# Patient Record
Sex: FEMALE | Race: WHITE | NOT HISPANIC OR LATINO | Employment: OTHER
[De-identification: names, ages, dates, MRNs, and addresses within clinical notes are randomized per-mention and may not be internally consistent; named-entity substitution may affect disease eponyms.]

---

## 2018-11-28 ENCOUNTER — PRIOR ORIGINAL RECORDS (OUTPATIENT)
Dept: OTHER | Age: 33
End: 2018-11-28

## 2018-12-13 ENCOUNTER — PRIOR ORIGINAL RECORDS (OUTPATIENT)
Dept: OTHER | Age: 33
End: 2018-12-13

## 2018-12-13 ENCOUNTER — HOSPITAL ENCOUNTER (OUTPATIENT)
Dept: CV DIAGNOSTICS | Facility: HOSPITAL | Age: 33
Discharge: HOME | End: 2018-12-13
Attending: INTERNAL MEDICINE
Payer: COMMERCIAL

## 2018-12-13 DIAGNOSIS — I47.2 VENTRICULAR TACHYARRHYTHMIA (HCC): ICD-10-CM

## 2018-12-13 PROCEDURE — 93226 XTRNL ECG REC<48 HR SCAN A/R: CPT | Performed by: INTERNAL MEDICINE

## 2018-12-13 PROCEDURE — 93225 XTRNL ECG REC<48 HRS REC: CPT | Performed by: INTERNAL MEDICINE

## 2018-12-13 PROCEDURE — 93018 CV STRESS TEST I&R ONLY: CPT | Performed by: INTERNAL MEDICINE

## 2018-12-13 PROCEDURE — 93017 CV STRESS TEST TRACING ONLY: CPT | Performed by: INTERNAL MEDICINE

## 2018-12-13 PROCEDURE — 93227 XTRNL ECG REC<48 HR R&I: CPT | Performed by: INTERNAL MEDICINE

## 2018-12-13 NOTE — PROGRESS NOTES
Pt in dept for treadmill stress test. Pt with multiple multifocal PVCs, v-triplets, v-couplets with exercise. Pt had 23 beats VT (approx 8 seconds) at peak exercise (13:25 minutes). Pt denies cardiac symptoms. States she only felt fatigued at peak.  S/w Moiz Mendoza

## 2018-12-17 ENCOUNTER — PRIOR ORIGINAL RECORDS (OUTPATIENT)
Dept: OTHER | Age: 33
End: 2018-12-17

## 2019-01-03 ENCOUNTER — TELEPHONE (OUTPATIENT)
Dept: OBGYN CLINIC | Facility: CLINIC | Age: 34
End: 2019-01-03

## 2019-01-03 NOTE — TELEPHONE ENCOUNTER
LMP: 18  EDC based on lmp: 19    8 wks on 19        Are cycles regular?: yes    Medical problems: ventricular tachycardia with hx of ablation 2 yrs ago, exercise induced arrhythmia    Past sx hx: heart ablation 2 years ago    Current me

## 2019-01-21 ENCOUNTER — OFFICE VISIT (OUTPATIENT)
Dept: OBGYN CLINIC | Facility: CLINIC | Age: 34
End: 2019-01-21
Payer: COMMERCIAL

## 2019-01-21 ENCOUNTER — NURSE ONLY (OUTPATIENT)
Dept: OBGYN CLINIC | Facility: CLINIC | Age: 34
End: 2019-01-21
Payer: COMMERCIAL

## 2019-01-21 VITALS
HEART RATE: 53 BPM | SYSTOLIC BLOOD PRESSURE: 96 MMHG | DIASTOLIC BLOOD PRESSURE: 62 MMHG | WEIGHT: 135 LBS | HEIGHT: 64.5 IN | BODY MASS INDEX: 22.77 KG/M2

## 2019-01-21 DIAGNOSIS — N91.2 AMENORRHEA: Primary | ICD-10-CM

## 2019-01-21 PROCEDURE — 99203 OFFICE O/P NEW LOW 30 MIN: CPT | Performed by: OBSTETRICS & GYNECOLOGY

## 2019-01-21 PROCEDURE — 76817 TRANSVAGINAL US OBSTETRIC: CPT | Performed by: OBSTETRICS & GYNECOLOGY

## 2019-01-21 NOTE — PROGRESS NOTES
Amenorrhea  Regular menses  LMP 11/29/18   7w 4d      C/w scan   7 w 5d  Will use EDC 9/5/19 by LMP    PMH: Tachycardia (SVT) [facility] cardiology  [facility]: cardiac ablation, T&A, hernia repair (infant)    Prenatal care and course discussed with patient including u

## 2019-01-30 ENCOUNTER — PRIOR ORIGINAL RECORDS (OUTPATIENT)
Dept: OTHER | Age: 34
End: 2019-01-30

## 2019-01-30 ENCOUNTER — MYAURORA ACCOUNT LINK (OUTPATIENT)
Dept: OTHER | Age: 34
End: 2019-01-30

## 2019-02-04 ENCOUNTER — INITIAL PRENATAL (OUTPATIENT)
Dept: OBGYN CLINIC | Facility: CLINIC | Age: 34
End: 2019-02-04
Payer: COMMERCIAL

## 2019-02-04 VITALS
BODY MASS INDEX: 22.94 KG/M2 | WEIGHT: 136 LBS | HEART RATE: 56 BPM | HEIGHT: 64.5 IN | DIASTOLIC BLOOD PRESSURE: 70 MMHG | SYSTOLIC BLOOD PRESSURE: 110 MMHG

## 2019-02-04 DIAGNOSIS — Z36.9 ENCOUNTER FOR ANTENATAL SCREENING OF MOTHER: ICD-10-CM

## 2019-02-04 DIAGNOSIS — Z86.79 H/O SUSTAINED VENTRICULAR TACHYCARDIA: ICD-10-CM

## 2019-02-04 DIAGNOSIS — Z34.01 ENCOUNTER FOR SUPERVISION OF NORMAL FIRST PREGNANCY, FIRST TRIMESTER: Primary | ICD-10-CM

## 2019-02-04 PROCEDURE — 87086 URINE CULTURE/COLONY COUNT: CPT | Performed by: OBSTETRICS & GYNECOLOGY

## 2019-02-06 ENCOUNTER — PRIOR ORIGINAL RECORDS (OUTPATIENT)
Dept: OTHER | Age: 34
End: 2019-02-06

## 2019-02-20 ENCOUNTER — LAB ENCOUNTER (OUTPATIENT)
Dept: LAB | Age: 34
End: 2019-02-20
Attending: OBSTETRICS & GYNECOLOGY
Payer: COMMERCIAL

## 2019-02-20 DIAGNOSIS — Z36.9 ENCOUNTER FOR ANTENATAL SCREENING OF MOTHER: ICD-10-CM

## 2019-02-20 LAB
ANTIBODY SCREEN: NEGATIVE
BASOPHILS # BLD AUTO: 0.03 X10(3) UL (ref 0–0.2)
BASOPHILS NFR BLD AUTO: 0.3 %
DEPRECATED RDW RBC AUTO: 44.9 FL (ref 35.1–46.3)
EOSINOPHIL # BLD AUTO: 0.13 X10(3) UL (ref 0–0.7)
EOSINOPHIL NFR BLD AUTO: 1.3 %
ERYTHROCYTE [DISTWIDTH] IN BLOOD BY AUTOMATED COUNT: 12.9 % (ref 11–15)
HBV SURFACE AG SER-ACNC: <0.1 [IU]/L
HBV SURFACE AG SERPL QL IA: NONREACTIVE
HCT VFR BLD AUTO: 36.2 % (ref 35–48)
HGB BLD-MCNC: 12.2 G/DL (ref 12–16)
IMM GRANULOCYTES # BLD AUTO: 0.03 X10(3) UL (ref 0–1)
IMM GRANULOCYTES NFR BLD: 0.3 %
LYMPHOCYTES # BLD AUTO: 1.8 X10(3) UL (ref 1–4)
LYMPHOCYTES NFR BLD AUTO: 18.1 %
MCH RBC QN AUTO: 32 PG (ref 26–34)
MCHC RBC AUTO-ENTMCNC: 33.7 G/DL (ref 31–37)
MCV RBC AUTO: 95 FL (ref 80–100)
MONOCYTES # BLD AUTO: 0.74 X10(3) UL (ref 0.1–1)
MONOCYTES NFR BLD AUTO: 7.4 %
NEUTROPHILS # BLD AUTO: 7.24 X10 (3) UL (ref 1.5–7.7)
NEUTROPHILS # BLD AUTO: 7.24 X10(3) UL (ref 1.5–7.7)
NEUTROPHILS NFR BLD AUTO: 72.6 %
PLATELET # BLD AUTO: 198 10(3)UL (ref 150–450)
RBC # BLD AUTO: 3.81 X10(6)UL (ref 3.8–5.3)
RH BLOOD TYPE: POSITIVE
RUBV IGG SER QL: POSITIVE
RUBV IGG SER-ACNC: 94.2 IU/ML (ref 10–?)
T PALLIDUM AB SER QL IA: NONREACTIVE
WBC # BLD AUTO: 10 X10(3) UL (ref 4–11)

## 2019-02-20 PROCEDURE — 87389 HIV-1 AG W/HIV-1&-2 AB AG IA: CPT | Performed by: OBSTETRICS & GYNECOLOGY

## 2019-02-20 PROCEDURE — 86780 TREPONEMA PALLIDUM: CPT | Performed by: OBSTETRICS & GYNECOLOGY

## 2019-02-20 PROCEDURE — 36415 COLL VENOUS BLD VENIPUNCTURE: CPT | Performed by: OBSTETRICS & GYNECOLOGY

## 2019-02-20 PROCEDURE — 85025 COMPLETE CBC W/AUTO DIFF WBC: CPT | Performed by: OBSTETRICS & GYNECOLOGY

## 2019-02-20 PROCEDURE — 86900 BLOOD TYPING SEROLOGIC ABO: CPT | Performed by: OBSTETRICS & GYNECOLOGY

## 2019-02-20 PROCEDURE — 86850 RBC ANTIBODY SCREEN: CPT | Performed by: OBSTETRICS & GYNECOLOGY

## 2019-02-20 PROCEDURE — 86762 RUBELLA ANTIBODY: CPT | Performed by: OBSTETRICS & GYNECOLOGY

## 2019-02-20 PROCEDURE — 87340 HEPATITIS B SURFACE AG IA: CPT | Performed by: OBSTETRICS & GYNECOLOGY

## 2019-02-20 PROCEDURE — 86901 BLOOD TYPING SEROLOGIC RH(D): CPT | Performed by: OBSTETRICS & GYNECOLOGY

## 2019-02-24 ENCOUNTER — APPOINTMENT (OUTPATIENT)
Dept: ULTRASOUND IMAGING | Facility: HOSPITAL | Age: 34
DRG: 832 | End: 2019-02-24
Attending: EMERGENCY MEDICINE
Payer: COMMERCIAL

## 2019-02-24 ENCOUNTER — HOSPITAL ENCOUNTER (INPATIENT)
Facility: HOSPITAL | Age: 34
LOS: 1 days | Discharge: HOME | DRG: 832 | End: 2019-02-25
Attending: EMERGENCY MEDICINE | Admitting: INTERNAL MEDICINE
Payer: COMMERCIAL

## 2019-02-24 DIAGNOSIS — I47.2 VENTRICULAR TACHYCARDIA (HCC): Primary | ICD-10-CM

## 2019-02-24 LAB
ALBUMIN SERPL-MCNC: 3.3 G/DL (ref 3.4–5)
ALBUMIN/GLOB SERPL: 0.9 {RATIO} (ref 1–2)
ALP LIVER SERPL-CCNC: 61 U/L (ref 37–98)
ALT SERPL-CCNC: 34 U/L (ref 13–56)
ANION GAP SERPL CALC-SCNC: 9 MMOL/L (ref 0–18)
AST SERPL-CCNC: 30 U/L (ref 15–37)
ATRIAL RATE: 220 BPM
ATRIAL RATE: 72 BPM
BASOPHILS # BLD AUTO: 0.06 X10(3) UL (ref 0–0.2)
BASOPHILS NFR BLD AUTO: 0.6 %
BILIRUB SERPL-MCNC: 0.2 MG/DL (ref 0.1–2)
BUN BLD-MCNC: 14 MG/DL (ref 7–18)
BUN/CREAT SERPL: 17.7 (ref 10–20)
CALCIUM BLD-MCNC: 9 MG/DL (ref 8.5–10.1)
CHLORIDE SERPL-SCNC: 108 MMOL/L (ref 98–107)
CO2 SERPL-SCNC: 23 MMOL/L (ref 21–32)
CREAT BLD-MCNC: 0.79 MG/DL (ref 0.55–1.02)
DEPRECATED RDW RBC AUTO: 42 FL (ref 35.1–46.3)
EOSINOPHIL # BLD AUTO: 0.22 X10(3) UL (ref 0–0.7)
EOSINOPHIL NFR BLD AUTO: 2 %
ERYTHROCYTE [DISTWIDTH] IN BLOOD BY AUTOMATED COUNT: 12.8 % (ref 11–15)
GLOBULIN PLAS-MCNC: 3.6 G/DL (ref 2.8–4.4)
GLUCOSE BLD-MCNC: 98 MG/DL (ref 70–99)
HAV IGM SER QL: 1.9 MG/DL (ref 1.6–2.6)
HCT VFR BLD AUTO: 38.6 % (ref 35–48)
HGB BLD-MCNC: 13.7 G/DL (ref 12–16)
IMM GRANULOCYTES # BLD AUTO: 0.03 X10(3) UL (ref 0–1)
IMM GRANULOCYTES NFR BLD: 0.3 %
LYMPHOCYTES # BLD AUTO: 2.87 X10(3) UL (ref 1–4)
LYMPHOCYTES NFR BLD AUTO: 26.5 %
M PROTEIN MFR SERPL ELPH: 6.9 G/DL (ref 6.4–8.2)
MCH RBC QN AUTO: 32.1 PG (ref 26–34)
MCHC RBC AUTO-ENTMCNC: 35.5 G/DL (ref 31–37)
MCV RBC AUTO: 90.4 FL (ref 80–100)
MONOCYTES # BLD AUTO: 0.86 X10(3) UL (ref 0.1–1)
MONOCYTES NFR BLD AUTO: 7.9 %
NEUTROPHILS # BLD AUTO: 6.78 X10 (3) UL (ref 1.5–7.7)
NEUTROPHILS # BLD AUTO: 6.78 X10(3) UL (ref 1.5–7.7)
NEUTROPHILS NFR BLD AUTO: 62.7 %
OSMOLALITY SERPL CALC.SUM OF ELEC: 290 MOSM/KG (ref 275–295)
P AXIS: 67 DEGREES
P-R INTERVAL: 140 MS
PLATELET # BLD AUTO: 245 10(3)UL (ref 150–450)
POTASSIUM SERPL-SCNC: 3.7 MMOL/L (ref 3.5–5.1)
Q-T INTERVAL: 270 MS
Q-T INTERVAL: 322 MS
QRS DURATION: 168 MS
QRS DURATION: 82 MS
QTC CALCULATION (BEZET): 352 MS
QTC CALCULATION (BEZET): 525 MS
R AXIS: 61 DEGREES
R AXIS: 63 DEGREES
RBC # BLD AUTO: 4.27 X10(6)UL (ref 3.8–5.3)
SODIUM SERPL-SCNC: 140 MMOL/L (ref 136–145)
T AXIS: 248 DEGREES
T AXIS: 75 DEGREES
TROPONIN I SERPL-MCNC: 0.12 NG/ML (ref ?–0.04)
VENTRICULAR RATE: 227 BPM
VENTRICULAR RATE: 72 BPM
WBC # BLD AUTO: 10.8 X10(3) UL (ref 4–11)

## 2019-02-24 PROCEDURE — 5A2204Z RESTORATION OF CARDIAC RHYTHM, SINGLE: ICD-10-PCS | Performed by: EMERGENCY MEDICINE

## 2019-02-24 PROCEDURE — 76801 OB US < 14 WKS SINGLE FETUS: CPT | Performed by: EMERGENCY MEDICINE

## 2019-02-24 PROCEDURE — 99223 1ST HOSP IP/OBS HIGH 75: CPT | Performed by: INTERNAL MEDICINE

## 2019-02-24 RX ORDER — MIDAZOLAM HYDROCHLORIDE 1 MG/ML
INJECTION INTRAMUSCULAR; INTRAVENOUS
Status: DISPENSED
Start: 2019-02-24 | End: 2019-02-25

## 2019-02-24 RX ORDER — ETOMIDATE 2 MG/ML
INJECTION INTRAVENOUS
Status: COMPLETED | OUTPATIENT
Start: 2019-02-24 | End: 2019-02-24

## 2019-02-24 RX ORDER — ENOXAPARIN SODIUM 100 MG/ML
40 INJECTION SUBCUTANEOUS DAILY
Status: DISCONTINUED | OUTPATIENT
Start: 2019-02-24 | End: 2019-02-25

## 2019-02-24 RX ORDER — ADENOSINE 3 MG/ML
6 INJECTION, SOLUTION INTRAVENOUS ONCE
Status: DISCONTINUED | OUTPATIENT
Start: 2019-02-24 | End: 2019-02-25

## 2019-02-24 RX ORDER — ADENOSINE 3 MG/ML
INJECTION, SOLUTION INTRAVENOUS
Status: DISPENSED
Start: 2019-02-24 | End: 2019-02-25

## 2019-02-24 RX ORDER — AMIODARONE HYDROCHLORIDE 50 MG/ML
INJECTION, SOLUTION INTRAVENOUS
Status: COMPLETED
Start: 2019-02-24 | End: 2019-02-24

## 2019-02-24 RX ORDER — ACEBUTOLOL HYDROCHLORIDE 200 MG/1
200 CAPSULE ORAL DAILY PRN
Status: ON HOLD | COMMUNITY
End: 2019-02-25

## 2019-02-24 RX ORDER — SODIUM CHLORIDE 9 MG/ML
INJECTION, SOLUTION INTRAVENOUS CONTINUOUS
Status: DISCONTINUED | OUTPATIENT
Start: 2019-02-24 | End: 2019-02-25

## 2019-02-24 RX ORDER — CHOLECALCIFEROL (VITAMIN D3) 25 MCG
1 TABLET,CHEWABLE ORAL DAILY
Status: DISCONTINUED | OUTPATIENT
Start: 2019-02-25 | End: 2019-02-25

## 2019-02-24 RX ORDER — MIDAZOLAM HYDROCHLORIDE 5 MG/ML
2 INJECTION INTRAMUSCULAR; INTRAVENOUS ONCE
Status: DISCONTINUED | OUTPATIENT
Start: 2019-02-24 | End: 2019-02-24

## 2019-02-24 NOTE — ED PROVIDER NOTES
Patient Seen in: [facility] Emergency Department    History   Patient presents with:  Arrythmia/Palpitations (cardiovascular)    Stated Complaint: palpitations    HPI    Patient is a 39-year-old  pregnant female whose EGA is approximately 15 we distress. Neuro: CN II-XII intact. Grossly normal and symmetric motor strength and sensation proximally and distally throughout 4 extremities. HEENT: No lymphadenopathy.  Mucus membranes moist.   Supple neck without any meningismus or rigidity  Cardiovas noted on EKG Report. Rate: 227  Rhythm: Ventricular tachycardia  Reading: Ventricular tachycardia, with a widened QRS duration 160 ms              On arrival the patient she states that she believes she is in ventricular tachycardia.   She was immediately segment changes. See above. I believe the mildly elevated troponin is likely from her increased ventricular rate and strain. She has been observed in the ED, and has been hemodynamically stable, in normal sinus rhythm.   She will be admitted to the

## 2019-02-24 NOTE — H&P
[facility]  History and Physical     Celi Weber Patient Status:  Emergency    10/14/1985 MRN [id number]   Location [address] [facility] Attending Ewa Tan MD   Hosp Day # 0 PCP Jadyn Velásquez M.D.      Chief Complaint: (OMEGA-3-6-9 OR) Take by mouth. Disp:  Rfl:    Multiple Vitamins-Minerals (TAB-A-ROSENDO MAXIMUM) Oral Tab Take 1 tablet by mouth daily. Disp:  Rfl:    B Complex-C-Folic Acid (EQL B COMPLEX) Oral Tab Take 1 tablet by mouth daily.  Disp:  Rfl:    Cholecalcifero PLAN:     1. Wide complex tachycardia s/p cardioversion   1. Defer management to cardiologist/ EP  2. Tele   3. TSH  4. Replace K, Mg  2. Hypotension   1. Resolved. Due to meds  2. Monitor , gentle hydration  3. Mild elevation of troponin   1.  Due to deman

## 2019-02-24 NOTE — ED INITIAL ASSESSMENT (HPI)
Patient arrives with c/o palpitations that began 20 minutes PTA. Patient was working out when she felt palpitations. Patient is 12 weeks pregnant.

## 2019-02-24 NOTE — CONSULTS
[facility]  Report of Consultation    Susan Mercedes Patient Status:  Emergency    10/14/1985 MRN [id number]   Location [address] Attending Janet Tan MD   Hosp Day # 0 PCP Geo Pastor M.D.     [facility] for [facility] described above in HPI. Physical Exam:  Blood pressure 104/62, pulse 62, resp. rate 18, last menstrual period 11/29/2018, SpO2 100 %. No data recorded.     Wt Readings from Last 3 Encounters:  02/04/19 : 136 lb (61.7 kg)  01/21/19 : 135 lb (61.2 kg)  07

## 2019-02-25 VITALS
RESPIRATION RATE: 16 BRPM | HEART RATE: 50 BPM | BODY MASS INDEX: 23.27 KG/M2 | WEIGHT: 138 LBS | TEMPERATURE: 98 F | OXYGEN SATURATION: 95 % | DIASTOLIC BLOOD PRESSURE: 50 MMHG | SYSTOLIC BLOOD PRESSURE: 93 MMHG | HEIGHT: 64.5 IN

## 2019-02-25 LAB
ANION GAP SERPL CALC-SCNC: 4 MMOL/L (ref 0–18)
BUN BLD-MCNC: 6 MG/DL (ref 7–18)
BUN/CREAT SERPL: 9.8 (ref 10–20)
CALCIUM BLD-MCNC: 8.2 MG/DL (ref 8.5–10.1)
CHLORIDE SERPL-SCNC: 111 MMOL/L (ref 98–107)
CO2 SERPL-SCNC: 25 MMOL/L (ref 21–32)
CREAT BLD-MCNC: 0.61 MG/DL (ref 0.55–1.02)
GLUCOSE BLD-MCNC: 85 MG/DL (ref 70–99)
HAV IGM SER QL: 1.6 MG/DL (ref 1.6–2.6)
OSMOLALITY SERPL CALC.SUM OF ELEC: 287 MOSM/KG (ref 275–295)
POTASSIUM SERPL-SCNC: 3.8 MMOL/L (ref 3.5–5.1)
SODIUM SERPL-SCNC: 140 MMOL/L (ref 136–145)
TSI SER-ACNC: 0.98 MIU/ML (ref 0.36–3.74)

## 2019-02-25 PROCEDURE — 99232 SBSQ HOSP IP/OBS MODERATE 35: CPT | Performed by: HOSPITALIST

## 2019-02-25 RX ORDER — DOXEPIN HYDROCHLORIDE 50 MG/1
3 CAPSULE ORAL EVERY MORNING
Status: DISCONTINUED | OUTPATIENT
Start: 2019-02-25 | End: 2019-02-25 | Stop reason: SDUPTHER

## 2019-02-25 RX ORDER — ACETAMINOPHEN 325 MG/1
TABLET ORAL
Status: DISCONTINUED
Start: 2019-02-25 | End: 2019-02-25

## 2019-02-25 RX ORDER — POTASSIUM CHLORIDE 20 MEQ/1
40 TABLET, EXTENDED RELEASE ORAL ONCE
Status: COMPLETED | OUTPATIENT
Start: 2019-02-25 | End: 2019-02-25

## 2019-02-25 RX ORDER — ACETAMINOPHEN 325 MG/1
650 TABLET ORAL EVERY 6 HOURS PRN
Status: DISCONTINUED | OUTPATIENT
Start: 2019-02-25 | End: 2019-02-25

## 2019-02-25 RX ORDER — DOXEPIN HYDROCHLORIDE 50 MG/1
3 CAPSULE ORAL EVERY MORNING
Status: DISCONTINUED | OUTPATIENT
Start: 2019-02-25 | End: 2019-02-25

## 2019-02-25 RX ORDER — MAGNESIUM OXIDE 400 MG (241.3 MG MAGNESIUM) TABLET
400 TABLET ONCE
Status: COMPLETED | OUTPATIENT
Start: 2019-02-25 | End: 2019-02-25

## 2019-02-25 RX ORDER — ACEBUTOLOL HYDROCHLORIDE 200 MG/1
200 CAPSULE ORAL DAILY
Status: DISCONTINUED | OUTPATIENT
Start: 2019-02-25 | End: 2019-02-25

## 2019-02-25 RX ORDER — ACEBUTOLOL HYDROCHLORIDE 200 MG/1
200 CAPSULE ORAL DAILY
Refills: 0 | Status: SHIPPED | COMMUNITY
Start: 2019-02-25 | End: 2019-08-13

## 2019-02-25 NOTE — PLAN OF CARE
CARDIOVASCULAR - ADULT    • Maintains optimal cardiac output and hemodynamic stability Progressing    • Absence of cardiac arrhythmias or at baseline Progressing        Tele monitoring. I/o. IVF.  at bedside.  Possible discharge in am.

## 2019-02-25 NOTE — PLAN OF CARE
CARDIOVASCULAR - ADULT    • Maintains optimal cardiac output and hemodynamic stability Adequate for Discharge    • Absence of cardiac arrhythmias or at baseline Adequate for Discharge        Iv and tele dc;d. Patient tolerated well. IV catheter tip intact.

## 2019-02-25 NOTE — PROGRESS NOTES
[facility]  Progress Note    Rohini Fajardo Patient Status:  Inpatient    10/14/1985 MRN [id number]   [facility] 8NE-A Attending Monty Ritter MD   Hosp Day # 1 PCP Luz Marina Back M.D. Assessment:  1. VT- RVOT.  Had a previous abla 245.0 02/24/2019     No results found for: PT, INR  Lab Results   Component Value Date     02/25/2019    K 3.8 02/25/2019     02/25/2019    CO2 25.0 02/25/2019    BUN 6 02/25/2019    CREATSERUM 0.61 02/25/2019    GLU 85 02/25/2019    MG 1.6 02/

## 2019-02-25 NOTE — PROGRESS NOTES
[facility]  Progress note     Shani Lora Patient Status:  Emergency    10/14/1985 MRN [id number]   Location [address] [facility] Attending Will, Lenoard Leyden, MD   Hosp Day # 1 PCP Randolph Concepcion M.D.      Chief Complaint: palpit 1. Resolved. Due to meds  2. Monitor , gentle hydration  3. Mild elevation of troponin   1. Due to demand ischemia, per #1, monitor  4. Pregnancy first trimester- OB follow up as usual.     Await EP recs.   Possibly home later today if no further workup p

## 2019-02-25 NOTE — PLAN OF CARE
CARDIOVASCULAR - ADULT    • Maintains optimal cardiac output and hemodynamic stability Progressing    • Absence of cardiac arrhythmias or at baseline Progressing          CARDIOVASCULAR - ADULT    • Maintains optimal cardiac output and hemodynamic stabilit

## 2019-02-26 NOTE — DISCHARGE SUMMARY
[facility]  DISCHARGE SUMMARY     Helen Delong Patient Status:  Inpatient    10/14/1985 MRN [id number]   [facility] 8NE-A Attending No att. providers found   Hosp Day # 1 PCP Lorri Najjar, M.D.      Date of Admission: 2019  Da Caps  Commonly known as:  SECTRAL  What changed:    · when to take this  · reasons to take this      Take 1 capsule (200 mg total) by mouth daily.    Refills:  0        CONTINUE taking these medications      Instructions Prescription details   EQL B COMPLEX

## 2019-02-27 ENCOUNTER — TELEPHONE (OUTPATIENT)
Dept: OBGYN CLINIC | Facility: CLINIC | Age: 34
End: 2019-02-27

## 2019-02-27 NOTE — TELEPHONE ENCOUNTER
Received call from ED physician who treated Jatin Ocampo for V. Tachycardia. She was given a single dose of Amioderone, 150 mg. Apparent risk for fetal hypothyroidism, cases of cretinism reported. Spoke with cardiology and Dr. Elsi Julian.   Probable low risk, pharma

## 2019-02-28 VITALS
WEIGHT: 135 LBS | SYSTOLIC BLOOD PRESSURE: 90 MMHG | HEART RATE: 60 BPM | BODY MASS INDEX: 22.49 KG/M2 | DIASTOLIC BLOOD PRESSURE: 60 MMHG | HEIGHT: 65 IN

## 2019-02-28 VITALS
DIASTOLIC BLOOD PRESSURE: 64 MMHG | WEIGHT: 133 LBS | SYSTOLIC BLOOD PRESSURE: 92 MMHG | HEIGHT: 65 IN | BODY MASS INDEX: 22.16 KG/M2 | HEART RATE: 60 BPM

## 2019-02-28 NOTE — PAYOR COMM NOTE
--------------  ADMISSION REVIEW     Payor: MORIS GROVER  Subscriber #:  [id number]  Authorization Number: [id number]    Admit date: 2/24/19  Admit time: 7844       Patient Seen in: [facility] Emergency Department    History   Patient presents with:  Ar bilaterally. Lungs: Speaking full sentences without any distress or retractions. Clear to auscultation bilaterally no wheezes or rales or rhonchi. Abdomen: Normoactive bowel sounds. Soft, nondistended. No HSM or masses.  Nontender throughout abdomen to ruiz ER. She is no in NSR and stable.      Past Medical History:  Past Medical History:   Diagnosis Date   • Arrhythmia    • Human papilloma virus infection         Past Surgical History:   Past Surgical History:   Procedure Laterality Date   • ABLATION     • AD affect.       Diagnostic Data:      Labs:  Recent Labs   Lab  02/20/19   1529  02/24/19   1526   WBC  10.0  10.8   HGB  12.2  13.7   MCV  95.0  90.4   PLT  198.0  245.0       Recent Labs   Lab  02/24/19   1526   GLU  98   BUN  14   CREATSERUM  0.79   GFRAA

## 2019-03-04 ENCOUNTER — ROUTINE PRENATAL (OUTPATIENT)
Dept: OBGYN CLINIC | Facility: CLINIC | Age: 34
End: 2019-03-04
Payer: COMMERCIAL

## 2019-03-04 ENCOUNTER — ULTRASOUND ENCOUNTER (OUTPATIENT)
Dept: OBGYN CLINIC | Facility: CLINIC | Age: 34
End: 2019-03-04
Payer: COMMERCIAL

## 2019-03-04 VITALS
BODY MASS INDEX: 23.27 KG/M2 | HEIGHT: 64.5 IN | DIASTOLIC BLOOD PRESSURE: 58 MMHG | SYSTOLIC BLOOD PRESSURE: 96 MMHG | WEIGHT: 138 LBS

## 2019-03-04 DIAGNOSIS — O41.8X10 SUBCHORIONIC HEMATOMA IN FIRST TRIMESTER, SINGLE OR UNSPECIFIED FETUS: Primary | ICD-10-CM

## 2019-03-04 DIAGNOSIS — O46.8X1 SUBCHORIONIC HEMATOMA IN FIRST TRIMESTER, SINGLE OR UNSPECIFIED FETUS: Primary | ICD-10-CM

## 2019-03-04 PROCEDURE — 99213 OFFICE O/P EST LOW 20 MIN: CPT | Performed by: OBSTETRICS & GYNECOLOGY

## 2019-03-04 PROCEDURE — 76801 OB US < 14 WKS SINGLE FETUS: CPT | Performed by: OBSTETRICS & GYNECOLOGY

## 2019-03-04 NOTE — PATIENT INSTRUCTIONS
Medications Safe in Pregnancy  The following over-the-counter medications may be taken safely after 12 weeks gestation by any patient who is pregnant. Please follow the instructions on the package for adult dosage.   If you experience any symptoms danae

## 2019-03-04 NOTE — PROGRESS NOTES
POB  Reports vaginal bleeding that began as spotting and now more persistent   Denies abdominal or pelvic pain   Rh positive   OB US noted for large subchorionic hematoma, c/w with OB US on 02/24/19  D/w patient and her mother subchorionic hematoma includi

## 2019-03-09 ENCOUNTER — ROUTINE PRENATAL (OUTPATIENT)
Dept: OBGYN CLINIC | Facility: CLINIC | Age: 34
End: 2019-03-09
Payer: COMMERCIAL

## 2019-03-09 ENCOUNTER — TELEPHONE (OUTPATIENT)
Dept: OBGYN CLINIC | Facility: CLINIC | Age: 34
End: 2019-03-09

## 2019-03-09 VITALS — WEIGHT: 138.38 LBS | SYSTOLIC BLOOD PRESSURE: 104 MMHG | DIASTOLIC BLOOD PRESSURE: 58 MMHG | BODY MASS INDEX: 23 KG/M2

## 2019-03-09 DIAGNOSIS — O41.8X10 SUBCHORIONIC HEMATOMA IN FIRST TRIMESTER, SINGLE OR UNSPECIFIED FETUS: ICD-10-CM

## 2019-03-09 DIAGNOSIS — Z34.01 ENCOUNTER FOR SUPERVISION OF NORMAL FIRST PREGNANCY, FIRST TRIMESTER: Primary | ICD-10-CM

## 2019-03-09 DIAGNOSIS — O46.8X1 SUBCHORIONIC HEMATOMA IN FIRST TRIMESTER, SINGLE OR UNSPECIFIED FETUS: ICD-10-CM

## 2019-03-09 NOTE — PROGRESS NOTES
Some light bleed intermittent, old from subchorionic bleed  Discussed exercise  Hemorrhoid discussed, metamucil  Amioderone discussed  MFM scan level II  4 weeks

## 2019-03-09 NOTE — TELEPHONE ENCOUNTER
Patient brought in her FMLA forms in today to be completed. Patient also paid for the forms to be done. Patient would like to  the forms at her next appt, April 6, 2019. Forms are in the [facility] office put in the nurse bin.

## 2019-03-16 ENCOUNTER — TELEPHONE (OUTPATIENT)
Dept: OBGYN CLINIC | Facility: CLINIC | Age: 34
End: 2019-03-16

## 2019-03-16 ENCOUNTER — HOSPITAL ENCOUNTER (OUTPATIENT)
Dept: CV DIAGNOSTICS | Facility: HOSPITAL | Age: 34
Discharge: HOME | End: 2019-03-16
Attending: INTERNAL MEDICINE
Payer: COMMERCIAL

## 2019-03-16 DIAGNOSIS — I47.2 VENTRICULAR TACHYCARDIA (HCC): ICD-10-CM

## 2019-03-16 NOTE — PROGRESS NOTES
Pt here for regular stress test. Pt concerned about exercise due to being 15 weeks pregnant. Pt reports she is on pelvic rest and advised against any strenuous exercise.  Dr Oneida Bronson consulted and does not want her heart rate above 140 bpm. 90% APMHR for stre

## 2019-03-16 NOTE — TELEPHONE ENCOUNTER
Pt sent for stress test  Ob says no elevated BP  Cardiologist wants BP elevated  They need OB clearance

## 2019-03-21 ENCOUNTER — MED REC SCAN ONLY (OUTPATIENT)
Dept: OBGYN CLINIC | Facility: CLINIC | Age: 34
End: 2019-03-21

## 2019-03-21 NOTE — TELEPHONE ENCOUNTER
Paperwork completed and signed. Copy to scanning and FMLA folder. Original at  in [place] for patient  at next appt.

## 2019-03-26 ENCOUNTER — TELEPHONE (OUTPATIENT)
Dept: CARDIOLOGY | Age: 34
End: 2019-03-26

## 2019-03-26 DIAGNOSIS — I47.20 VENTRICULAR TACHYCARDIA (CMD): Primary | ICD-10-CM

## 2019-03-29 ENCOUNTER — HOSPITAL ENCOUNTER (OUTPATIENT)
Dept: CV DIAGNOSTICS | Facility: HOSPITAL | Age: 34
Discharge: HOME | End: 2019-03-29
Attending: INTERNAL MEDICINE
Payer: COMMERCIAL

## 2019-03-29 DIAGNOSIS — I47.2 VENTRICULAR TACHYARRHYTHMIA (HCC): ICD-10-CM

## 2019-03-29 PROCEDURE — 93018 CV STRESS TEST I&R ONLY: CPT | Performed by: INTERNAL MEDICINE

## 2019-03-29 PROCEDURE — 93017 CV STRESS TEST TRACING ONLY: CPT | Performed by: INTERNAL MEDICINE

## 2019-03-29 RX ORDER — DIPHENOXYLATE HYDROCHLORIDE AND ATROPINE SULFATE 2.5; .025 MG/1; MG/1
TABLET ORAL
COMMUNITY

## 2019-04-04 PROBLEM — Z34.92 ENCOUNTER FOR SUPERVISION OF NORMAL PREGNANCY IN SECOND TRIMESTER: Status: ACTIVE | Noted: 2019-04-04

## 2019-04-04 NOTE — PROGRESS NOTES
MARISOL.   Doing well. No complaints. Denies abdominal/pelvic pain or vaginal bleeding. Rh positive   Genetic screening, pt request cffDNA and order placed.  Advised to check with insurance prior to collection, pt verbalized understanding and request order

## 2019-04-06 ENCOUNTER — ROUTINE PRENATAL (OUTPATIENT)
Dept: OBGYN CLINIC | Facility: CLINIC | Age: 34
End: 2019-04-06
Payer: COMMERCIAL

## 2019-04-06 VITALS — BODY MASS INDEX: 24 KG/M2 | DIASTOLIC BLOOD PRESSURE: 70 MMHG | SYSTOLIC BLOOD PRESSURE: 106 MMHG | WEIGHT: 142 LBS

## 2019-04-06 DIAGNOSIS — Z34.92 ENCOUNTER FOR SUPERVISION OF NORMAL PREGNANCY IN SECOND TRIMESTER, UNSPECIFIED GRAVIDITY: Primary | ICD-10-CM

## 2019-04-06 DIAGNOSIS — Z13.79 ENCOUNTER FOR GENETIC SCREENING: ICD-10-CM

## 2019-04-06 DIAGNOSIS — I47.2 VENTRICULAR TACHYCARDIA (HCC): ICD-10-CM

## 2019-04-06 DIAGNOSIS — O46.8X1 SUBCHORIONIC HEMATOMA IN FIRST TRIMESTER, SINGLE OR UNSPECIFIED FETUS: ICD-10-CM

## 2019-04-06 DIAGNOSIS — O41.8X10 SUBCHORIONIC HEMATOMA IN FIRST TRIMESTER, SINGLE OR UNSPECIFIED FETUS: ICD-10-CM

## 2019-04-12 ENCOUNTER — TELEPHONE (OUTPATIENT)
Dept: CARDIOLOGY | Age: 34
End: 2019-04-12

## 2019-04-16 ENCOUNTER — MED REC SCAN ONLY (OUTPATIENT)
Dept: SURGERY | Facility: CLINIC | Age: 34
End: 2019-04-16

## 2019-04-23 ENCOUNTER — TELEPHONE (OUTPATIENT)
Dept: OBGYN CLINIC | Facility: CLINIC | Age: 34
End: 2019-04-23

## 2019-04-23 ENCOUNTER — LAB ENCOUNTER (OUTPATIENT)
Dept: LAB | Age: 34
End: 2019-04-23
Attending: OBSTETRICS & GYNECOLOGY
Payer: COMMERCIAL

## 2019-04-23 ENCOUNTER — OFFICE VISIT (OUTPATIENT)
Dept: PERINATAL CARE | Facility: HOSPITAL | Age: 34
End: 2019-04-23
Attending: OBSTETRICS & GYNECOLOGY
Payer: COMMERCIAL

## 2019-04-23 VITALS
WEIGHT: 142 LBS | HEIGHT: 65 IN | DIASTOLIC BLOOD PRESSURE: 59 MMHG | HEART RATE: 48 BPM | SYSTOLIC BLOOD PRESSURE: 95 MMHG | BODY MASS INDEX: 23.66 KG/M2

## 2019-04-23 DIAGNOSIS — Z34.01 ENCOUNTER FOR SUPERVISION OF NORMAL FIRST PREGNANCY IN FIRST TRIMESTER: ICD-10-CM

## 2019-04-23 DIAGNOSIS — Z34.01 ENCOUNTER FOR SUPERVISION OF NORMAL FIRST PREGNANCY IN FIRST TRIMESTER: Primary | ICD-10-CM

## 2019-04-23 DIAGNOSIS — O46.8X1 SUBCHORIONIC HEMATOMA IN FIRST TRIMESTER, SINGLE OR UNSPECIFIED FETUS: ICD-10-CM

## 2019-04-23 DIAGNOSIS — Z13.89 MATERNAL POSTNATAL SCREENING FOR CHROMOSOMAL ANOMALIES: Primary | ICD-10-CM

## 2019-04-23 DIAGNOSIS — I47.2 VENTRICULAR TACHYCARDIA (HCC): ICD-10-CM

## 2019-04-23 DIAGNOSIS — O41.8X10 SUBCHORIONIC HEMATOMA IN FIRST TRIMESTER, SINGLE OR UNSPECIFIED FETUS: ICD-10-CM

## 2019-04-23 PROCEDURE — 99243 OFF/OP CNSLTJ NEW/EST LOW 30: CPT | Performed by: OBSTETRICS & GYNECOLOGY

## 2019-04-23 PROCEDURE — 76811 OB US DETAILED SNGL FETUS: CPT | Performed by: OBSTETRICS & GYNECOLOGY

## 2019-04-23 PROCEDURE — 82105 ALPHA-FETOPROTEIN SERUM: CPT | Performed by: OBSTETRICS & GYNECOLOGY

## 2019-04-23 PROCEDURE — 36415 COLL VENOUS BLD VENIPUNCTURE: CPT | Performed by: OBSTETRICS & GYNECOLOGY

## 2019-04-23 NOTE — PROGRESS NOTES
Pt here for level 2 ultrasound accompanied by her   States no fetal movement felt yet   No complaints

## 2019-04-23 NOTE — TELEPHONE ENCOUNTER
Patient and  presented in office. They were advised by Dr. Jasson Palm to have [address] testing and MSAFP, if covered by insurance. Per , they are both covered and they desire both. Pt already had requisition form and order for Mater[employer] [employer].

## 2019-04-23 NOTE — PROGRESS NOTES
Indication: subchorionic hematoma in 1st tri.   ____________________________________________________________________________  History: Age: 28 years.   ____________________________________________________________________________  Dating:  LMP: 11/29/2018 Structures:  Cervical length 37.4 mm.  ____________________________________________________________________________  Comments:    Dear Dr. Lou Albarado,       Thank you for requesting  an ultrasound and consultation for Anish Frederick regarding AMA.  Her prenatal or known adverse effects of antiarrhythmic drugs on the fetus, antiarrhythmic drugs are often reserved for the treatment of arrhythmias associated with clinically significant symptoms or hemodynamic compromise.  Treatment recommendations are hampered by the

## 2019-04-24 NOTE — PROGRESS NOTES
IMPRESSION:    1.  IUP at   20 5/7 wks    2. Scan consistent with dates     3.   No ultrasound evidence of structural abnormalities are seen   4.  cardiac arrhythmia     Recommendations:    1.  growth U/S  at 30 & 36wks

## 2019-04-29 ENCOUNTER — TELEPHONE (OUTPATIENT)
Dept: OBGYN CLINIC | Facility: CLINIC | Age: 34
End: 2019-04-29

## 2019-04-29 NOTE — TELEPHONE ENCOUNTER
[id number] results received via fax in put on [facility] desk in [place] for review     Thank you

## 2019-04-30 NOTE — TELEPHONE ENCOUNTER
Patient informed of normal results. Verbalized understanding. Already knows sex. No further questions or concerns.

## 2019-05-04 ENCOUNTER — ROUTINE PRENATAL (OUTPATIENT)
Dept: OBGYN CLINIC | Facility: CLINIC | Age: 34
End: 2019-05-04
Payer: COMMERCIAL

## 2019-05-04 VITALS — DIASTOLIC BLOOD PRESSURE: 68 MMHG | BODY MASS INDEX: 25 KG/M2 | SYSTOLIC BLOOD PRESSURE: 100 MMHG | WEIGHT: 148 LBS

## 2019-05-04 DIAGNOSIS — Z34.92 ENCOUNTER FOR SUPERVISION OF NORMAL PREGNANCY IN SECOND TRIMESTER, UNSPECIFIED GRAVIDITY: Primary | ICD-10-CM

## 2019-05-04 NOTE — PROGRESS NOTES
MARISOL  Doing well. Denies LOF/VB/uctx. +FM. RH +  S/P Anatomy scan - nl level II US.  MFM recs: Growth Us at 30 wks and 36 wks  1 hr glucose and CBC ordered  RTC  In 4 weeks

## 2019-05-29 ENCOUNTER — OFFICE VISIT (OUTPATIENT)
Dept: CARDIOLOGY | Age: 34
End: 2019-05-29

## 2019-05-29 VITALS
SYSTOLIC BLOOD PRESSURE: 78 MMHG | HEIGHT: 65 IN | WEIGHT: 147 LBS | BODY MASS INDEX: 24.49 KG/M2 | DIASTOLIC BLOOD PRESSURE: 50 MMHG | HEART RATE: 54 BPM

## 2019-05-29 DIAGNOSIS — I49.3 PVC'S (PREMATURE VENTRICULAR CONTRACTIONS): Primary | ICD-10-CM

## 2019-05-29 PROBLEM — I47.20 VENTRICULAR TACHYCARDIA (CMD): Status: ACTIVE | Noted: 2019-05-29

## 2019-05-29 PROCEDURE — 93000 ELECTROCARDIOGRAM COMPLETE: CPT | Performed by: INTERNAL MEDICINE

## 2019-05-29 PROCEDURE — 99214 OFFICE O/P EST MOD 30 MIN: CPT | Performed by: INTERNAL MEDICINE

## 2019-05-29 RX ORDER — ACEBUTOLOL HYDROCHLORIDE 200 MG/1
200 CAPSULE ORAL DAILY
COMMUNITY
End: 2019-05-29 | Stop reason: SDUPTHER

## 2019-05-29 RX ORDER — ACEBUTOLOL HYDROCHLORIDE 200 MG/1
200 CAPSULE ORAL DAILY
Qty: 90 CAPSULE | Refills: 1 | Status: SHIPPED | OUTPATIENT
Start: 2019-05-29 | End: 2019-11-11 | Stop reason: ALTCHOICE

## 2019-05-29 SDOH — HEALTH STABILITY: PHYSICAL HEALTH: ON AVERAGE, HOW MANY MINUTES DO YOU ENGAGE IN EXERCISE AT THIS LEVEL?: 60 MIN

## 2019-05-29 SDOH — HEALTH STABILITY: PHYSICAL HEALTH: ON AVERAGE, HOW MANY DAYS PER WEEK DO YOU ENGAGE IN MODERATE TO STRENUOUS EXERCISE (LIKE A BRISK WALK)?: 4 DAYS

## 2019-06-01 ENCOUNTER — ROUTINE PRENATAL (OUTPATIENT)
Dept: OBGYN CLINIC | Facility: CLINIC | Age: 34
End: 2019-06-01
Payer: COMMERCIAL

## 2019-06-01 VITALS — SYSTOLIC BLOOD PRESSURE: 106 MMHG | WEIGHT: 152 LBS | DIASTOLIC BLOOD PRESSURE: 62 MMHG | BODY MASS INDEX: 25 KG/M2

## 2019-06-01 DIAGNOSIS — Z34.02 ENCOUNTER FOR SUPERVISION OF NORMAL FIRST PREGNANCY IN SECOND TRIMESTER: Primary | ICD-10-CM

## 2019-06-01 NOTE — PROGRESS NOTES
MARISOL  Doing well  FM daily  RH positive  Encouraged 1 hour GTT/ CBC  TDAP recommended during pregnancy and instructions given  RTC 2 wks

## 2019-06-17 ENCOUNTER — LAB ENCOUNTER (OUTPATIENT)
Dept: LAB | Age: 34
End: 2019-06-17
Attending: OBSTETRICS & GYNECOLOGY
Payer: COMMERCIAL

## 2019-06-17 ENCOUNTER — ROUTINE PRENATAL (OUTPATIENT)
Dept: OBGYN CLINIC | Facility: CLINIC | Age: 34
End: 2019-06-17
Payer: COMMERCIAL

## 2019-06-17 VITALS — DIASTOLIC BLOOD PRESSURE: 58 MMHG | SYSTOLIC BLOOD PRESSURE: 95 MMHG | WEIGHT: 156 LBS | BODY MASS INDEX: 26 KG/M2

## 2019-06-17 DIAGNOSIS — M54.9 BACK PAIN IN PREGNANCY: Primary | ICD-10-CM

## 2019-06-17 DIAGNOSIS — Z3A.28 28 WEEKS GESTATION OF PREGNANCY: Primary | ICD-10-CM

## 2019-06-17 DIAGNOSIS — Z3A.28 28 WEEKS GESTATION OF PREGNANCY: ICD-10-CM

## 2019-06-17 DIAGNOSIS — O99.891 BACK PAIN IN PREGNANCY: Primary | ICD-10-CM

## 2019-06-17 DIAGNOSIS — Z34.03 ENCOUNTER FOR SUPERVISION OF NORMAL FIRST PREGNANCY IN THIRD TRIMESTER: ICD-10-CM

## 2019-06-17 DIAGNOSIS — Z86.79 H/O SUSTAINED VENTRICULAR TACHYCARDIA: ICD-10-CM

## 2019-06-17 DIAGNOSIS — M54.9 BACK PAIN IN PREGNANCY: ICD-10-CM

## 2019-06-17 DIAGNOSIS — Z34.92 ENCOUNTER FOR SUPERVISION OF NORMAL PREGNANCY IN SECOND TRIMESTER, UNSPECIFIED GRAVIDITY: ICD-10-CM

## 2019-06-17 DIAGNOSIS — O99.891 BACK PAIN IN PREGNANCY: ICD-10-CM

## 2019-06-17 PROCEDURE — 36415 COLL VENOUS BLD VENIPUNCTURE: CPT | Performed by: OBSTETRICS & GYNECOLOGY

## 2019-06-17 PROCEDURE — 87389 HIV-1 AG W/HIV-1&-2 AB AG IA: CPT | Performed by: OBSTETRICS & GYNECOLOGY

## 2019-06-17 PROCEDURE — 85025 COMPLETE CBC W/AUTO DIFF WBC: CPT | Performed by: OBSTETRICS & GYNECOLOGY

## 2019-06-17 PROCEDURE — 82950 GLUCOSE TEST: CPT | Performed by: OBSTETRICS & GYNECOLOGY

## 2019-06-17 NOTE — PROGRESS NOTES
MARISOL  Doing well  RH +  S/P Anatomy scan normal  1 hr glucose (24-28wks)today with cbc, hiv  TDAP recommended during pregnancy and instructions given  Very concerned re: plan with her arrhythmia diagnosis, wants a plan in place with cardiology.  Message sent

## 2019-06-18 ENCOUNTER — TELEPHONE (OUTPATIENT)
Dept: OBGYN CLINIC | Facility: CLINIC | Age: 34
End: 2019-06-18

## 2019-06-18 DIAGNOSIS — M54.9 BACK PAIN IN PREGNANCY: Primary | ICD-10-CM

## 2019-06-18 DIAGNOSIS — O99.891 BACK PAIN IN PREGNANCY: Primary | ICD-10-CM

## 2019-06-18 NOTE — TELEPHONE ENCOUNTER
Contacted patient. Let her know referral was placed last night to Physical therapy at [facility].  Patient states that she desires to go to [facility] in [place] on [address]. Brandi Wang order placed and faxed to [facility] at [phone]    Copy to file in [facility]

## 2019-06-26 ENCOUNTER — TELEPHONE (OUTPATIENT)
Dept: OBGYN CLINIC | Facility: CLINIC | Age: 34
End: 2019-06-26

## 2019-07-03 ENCOUNTER — ROUTINE PRENATAL (OUTPATIENT)
Dept: OBGYN CLINIC | Facility: CLINIC | Age: 34
End: 2019-07-03
Payer: COMMERCIAL

## 2019-07-03 ENCOUNTER — ULTRASOUND ENCOUNTER (OUTPATIENT)
Dept: OBGYN CLINIC | Facility: CLINIC | Age: 34
End: 2019-07-03
Payer: COMMERCIAL

## 2019-07-03 VITALS — WEIGHT: 159 LBS | SYSTOLIC BLOOD PRESSURE: 100 MMHG | DIASTOLIC BLOOD PRESSURE: 58 MMHG | BODY MASS INDEX: 26 KG/M2

## 2019-07-03 DIAGNOSIS — I47.2 VENTRICULAR TACHYCARDIA (HCC): ICD-10-CM

## 2019-07-03 DIAGNOSIS — Z34.03 ENCOUNTER FOR SUPERVISION OF NORMAL FIRST PREGNANCY IN THIRD TRIMESTER: Primary | ICD-10-CM

## 2019-07-03 PROBLEM — Z34.93 ENCOUNTER FOR SUPERVISION OF NORMAL PREGNANCY IN THIRD TRIMESTER: Status: ACTIVE | Noted: 2019-04-04

## 2019-07-03 PROCEDURE — 76816 OB US FOLLOW-UP PER FETUS: CPT | Performed by: OBSTETRICS & GYNECOLOGY

## 2019-07-03 NOTE — PROGRESS NOTES
MARISOL  Doing well, +FM  Denies LOF/VB/uctx  Growth Us 33.1% - small HC and BPD. No Zika exposure or recent travel. Maybe constitutional. Will have her do growth Us with MFM. Pt expressed understanding. Rh + , TDAP received.   Patient had long list of Jorge Lainez

## 2019-07-11 NOTE — TELEPHONE ENCOUNTER
Received Progress Notes from [employer] that requires signature.      Placed in Dr Ric tolentino at [facility]

## 2019-07-19 ENCOUNTER — ROUTINE PRENATAL (OUTPATIENT)
Dept: OBGYN CLINIC | Facility: CLINIC | Age: 34
End: 2019-07-19
Payer: COMMERCIAL

## 2019-07-19 VITALS — SYSTOLIC BLOOD PRESSURE: 110 MMHG | WEIGHT: 161 LBS | DIASTOLIC BLOOD PRESSURE: 70 MMHG | BODY MASS INDEX: 27 KG/M2

## 2019-07-19 DIAGNOSIS — O35.0XX0 MICROCEPHALY OF FETUS, SINGLE OR UNSPECIFIED FETUS: ICD-10-CM

## 2019-07-19 DIAGNOSIS — Z23 NEED FOR VACCINATION: ICD-10-CM

## 2019-07-19 DIAGNOSIS — Z34.03 ENCOUNTER FOR SUPERVISION OF NORMAL FIRST PREGNANCY IN THIRD TRIMESTER: Primary | ICD-10-CM

## 2019-07-19 DIAGNOSIS — O26.843 UTERINE SIZE-DATE DISCREPANCY IN THIRD TRIMESTER: ICD-10-CM

## 2019-07-19 PROCEDURE — 90471 IMMUNIZATION ADMIN: CPT | Performed by: OBSTETRICS & GYNECOLOGY

## 2019-07-19 PROCEDURE — 90715 TDAP VACCINE 7 YRS/> IM: CPT | Performed by: OBSTETRICS & GYNECOLOGY

## 2019-07-19 NOTE — PROGRESS NOTES
MARISOL  Doing well, +FM  Denies LOF/VB/uctx  Worried about head measurements today. Changed her mind and wants to see MFM. Referral placed. Rh +, TDAP received.     RTC in 2 wks    PTL and Fetal movement instructions given

## 2019-07-19 NOTE — PATIENT INSTRUCTIONS
FETAL MOVEMENT CHART    Begin counting fetal movements at 32 weeks of pregnancy. You may find that your baby is more active after eating or drinking. We want you to time how long it takes to feel 10 movements (kicks, flutters, swishes or rolls).   [place] especially if you have had one or more children. Each labor can begin in a different way even if you have had four or five children.     • If this is your first child, it is very common to have labor for an average greater than 10 hours; however, there hav occurs in approximately 30% of all patients is the rupture of the bag of water. This is a sudden gush of watery fluid, usually sufficient to run down your leg and onto the floor, or you may wet a large area of the bed if it happens at night.   There may al some aspects of pain medication with us during your prenatal care. The various options may also have been discussed in Prenatal Classes. We utilize IV narcotics and epidural anesthesia when our patients request to have them.   If you chose to have no anes an individual basis. If you decide to breastfeed your baby, you should continue your prenatal vitamins. If you do not breastfeed, simply finish the prenatal vitamins you have.       The staff at [place] [address] wish you a joyous and exciting b

## 2019-08-02 ENCOUNTER — ROUTINE PRENATAL (OUTPATIENT)
Dept: OBGYN CLINIC | Facility: CLINIC | Age: 34
End: 2019-08-02
Payer: COMMERCIAL

## 2019-08-02 VITALS
DIASTOLIC BLOOD PRESSURE: 70 MMHG | BODY MASS INDEX: 27.02 KG/M2 | WEIGHT: 162.19 LBS | HEIGHT: 65 IN | SYSTOLIC BLOOD PRESSURE: 110 MMHG

## 2019-08-02 DIAGNOSIS — Z36.9 PRENATAL SCREENING ENCOUNTER: Primary | ICD-10-CM

## 2019-08-02 LAB — MULTISTIX LOT#: NORMAL NUMERIC

## 2019-08-02 PROCEDURE — 81002 URINALYSIS NONAUTO W/O SCOPE: CPT | Performed by: OBSTETRICS & GYNECOLOGY

## 2019-08-02 NOTE — PROGRESS NOTES
MARISOL  Doing well, +FM  Denies LOF/VB/uctx  Having repeat grown with MFM next week. Remains anxious and worried about head measurements.    RTC 1 week

## 2019-08-07 NOTE — PROGRESS NOTES
Jakob Greer    Dear Dr. Delores Garcia    Thank you for requesting ultrasound evaluation and maternal fetal medicine consultation on your patient Deangelo Button.   As you are aware she is a 28year old female  with a single (g) 1972 g  <5th%     Fetal heart activity: present. Fetal heart rate: 138 bpm.   Fetal presentation: cephalic. Cord: normal.   Placenta: anterior. Fetal Anatomy:  Visualized with normal appearance: spine, kidneys, bladder.     Brain: Visualized and n light of this diagnosis, enhanced fetal surveillance is advised with twice weekly AP testing (weekly NST's with OB, weekly BPP / Dopplers with MFM).   In the absence of non-reassuring fetal status or an earlier maternal indication, delivery is advised at 40

## 2019-08-08 ENCOUNTER — OFFICE VISIT (OUTPATIENT)
Dept: PERINATAL CARE | Facility: HOSPITAL | Age: 34
End: 2019-08-08
Attending: OBSTETRICS & GYNECOLOGY
Payer: COMMERCIAL

## 2019-08-08 VITALS
SYSTOLIC BLOOD PRESSURE: 114 MMHG | DIASTOLIC BLOOD PRESSURE: 75 MMHG | WEIGHT: 162 LBS | BODY MASS INDEX: 27 KG/M2 | HEART RATE: 70 BPM

## 2019-08-08 DIAGNOSIS — O36.5930 INTRAUTERINE GROWTH RESTRICTION (IUGR) AFFECTING CARE OF MOTHER, THIRD TRIMESTER, SINGLE OR UNSPECIFIED FETUS: Primary | ICD-10-CM

## 2019-08-08 DIAGNOSIS — Z03.74 SUSPECTED PROBLEM WITH FETAL GROWTH NOT FOUND: ICD-10-CM

## 2019-08-08 DIAGNOSIS — I47.2 VENTRICULAR TACHYCARDIA (HCC): ICD-10-CM

## 2019-08-08 PROCEDURE — 76821 MIDDLE CEREBRAL ARTERY ECHO: CPT | Performed by: OBSTETRICS & GYNECOLOGY

## 2019-08-08 PROCEDURE — 76816 OB US FOLLOW-UP PER FETUS: CPT | Performed by: OBSTETRICS & GYNECOLOGY

## 2019-08-08 PROCEDURE — 76819 FETAL BIOPHYS PROFIL W/O NST: CPT | Performed by: OBSTETRICS & GYNECOLOGY

## 2019-08-08 PROCEDURE — 76820 UMBILICAL ARTERY ECHO: CPT | Performed by: OBSTETRICS & GYNECOLOGY

## 2019-08-08 PROCEDURE — 99214 OFFICE O/P EST MOD 30 MIN: CPT | Performed by: OBSTETRICS & GYNECOLOGY

## 2019-08-09 ENCOUNTER — ROUTINE PRENATAL (OUTPATIENT)
Dept: OBGYN CLINIC | Facility: CLINIC | Age: 34
End: 2019-08-09
Payer: COMMERCIAL

## 2019-08-09 VITALS — BODY MASS INDEX: 27 KG/M2 | SYSTOLIC BLOOD PRESSURE: 90 MMHG | WEIGHT: 162 LBS | DIASTOLIC BLOOD PRESSURE: 60 MMHG

## 2019-08-09 DIAGNOSIS — Z3A.36 36 WEEKS GESTATION OF PREGNANCY: ICD-10-CM

## 2019-08-09 DIAGNOSIS — Z36.9 PRENATAL SCREENING ENCOUNTER: Primary | ICD-10-CM

## 2019-08-09 DIAGNOSIS — Z34.03 ENCOUNTER FOR SUPERVISION OF NORMAL FIRST PREGNANCY IN THIRD TRIMESTER: ICD-10-CM

## 2019-08-09 LAB — MULTISTIX LOT#: NORMAL NUMERIC

## 2019-08-09 PROCEDURE — 81002 URINALYSIS NONAUTO W/O SCOPE: CPT | Performed by: OBSTETRICS & GYNECOLOGY

## 2019-08-09 PROCEDURE — 87081 CULTURE SCREEN ONLY: CPT | Performed by: OBSTETRICS & GYNECOLOGY

## 2019-08-09 NOTE — PROGRESS NOTES
MARISOL  Doing well, +FM  Denies VB/LOF/uctx  Mode of delivery:   anticipated  SVE cl/50/-2, vtx   GBS collected (35-37)  RTC mon/tu for nst  iol at 37 wks per mfm for iugr  cdil 08/15/19 2100, IOL 19  Multiple questions

## 2019-08-09 NOTE — PATIENT INSTRUCTIONS
Labor Instructions    How do I know if it’s true labor? • One of the most important aspects of any pregnancy is being able to recognize the onset of labor.   Unfortunately, on occasion it can be difficult or confusing, especially if you have had one or mor of the contractions. Having regular (usually closer), longer lasting (35-70 seconds), and sharper (more painful) contractions are the common symptoms of actual labor.   The second way in which labor can begin which occurs in approximately 30% of all patien prenatal care. The various options may also have been discussed in Prenatal Classes. We utilize IV narcotics and epidural anesthesia when our patients request to have them. If you chose to have no anesthesia, none will be administered.   A local anesthet your baby, you should continue your prenatal vitamins. If you do not breastfeed, simply finish the prenatal vitamins you have. The staff at [facility] wish you a joyous and exciting birth.   If there is anything we can do to make this

## 2019-08-12 ENCOUNTER — TELEPHONE (OUTPATIENT)
Dept: OBGYN UNIT | Facility: HOSPITAL | Age: 34
End: 2019-08-12

## 2019-08-13 ENCOUNTER — APPOINTMENT (OUTPATIENT)
Dept: OBGYN CLINIC | Facility: CLINIC | Age: 34
End: 2019-08-13
Payer: COMMERCIAL

## 2019-08-13 ENCOUNTER — ROUTINE PRENATAL (OUTPATIENT)
Dept: OBGYN CLINIC | Facility: CLINIC | Age: 34
End: 2019-08-13
Payer: COMMERCIAL

## 2019-08-13 VITALS — BODY MASS INDEX: 27 KG/M2 | SYSTOLIC BLOOD PRESSURE: 98 MMHG | DIASTOLIC BLOOD PRESSURE: 62 MMHG | WEIGHT: 161.63 LBS

## 2019-08-13 DIAGNOSIS — Z34.03 ENCOUNTER FOR SUPERVISION OF NORMAL FIRST PREGNANCY IN THIRD TRIMESTER: Primary | ICD-10-CM

## 2019-08-13 DIAGNOSIS — Z36.9 PRENATAL SCREENING ENCOUNTER: ICD-10-CM

## 2019-08-13 DIAGNOSIS — O26.843 UTERINE SIZE-DATE DISCREPANCY IN THIRD TRIMESTER: ICD-10-CM

## 2019-08-13 LAB — MULTISTIX LOT#: NORMAL NUMERIC

## 2019-08-13 PROCEDURE — 59025 FETAL NON-STRESS TEST: CPT | Performed by: NURSE PRACTITIONER

## 2019-08-13 PROCEDURE — 81002 URINALYSIS NONAUTO W/O SCOPE: CPT | Performed by: NURSE PRACTITIONER

## 2019-08-13 NOTE — PROGRESS NOTES
MARISOL  Doing well, +FM. Denies VB/LOF/uctx  Mode of delivery:  anticipated. IOL scheduled for Thursday evening (Cervidil)  Labor precautions discussed. Reviewed IOL procedure and expectations.    RTC 1 week  NST reactive

## 2019-08-14 ENCOUNTER — TELEPHONE (OUTPATIENT)
Dept: OBGYN UNIT | Facility: HOSPITAL | Age: 34
End: 2019-08-14

## 2019-08-14 ENCOUNTER — OFFICE VISIT (OUTPATIENT)
Dept: CARDIOLOGY | Age: 34
End: 2019-08-14

## 2019-08-14 VITALS
DIASTOLIC BLOOD PRESSURE: 58 MMHG | BODY MASS INDEX: 26.99 KG/M2 | HEART RATE: 84 BPM | WEIGHT: 162 LBS | HEIGHT: 65 IN | SYSTOLIC BLOOD PRESSURE: 100 MMHG

## 2019-08-14 DIAGNOSIS — I49.3 PVC'S (PREMATURE VENTRICULAR CONTRACTIONS): Primary | ICD-10-CM

## 2019-08-14 DIAGNOSIS — I47.20 VENTRICULAR TACHYCARDIA (CMD): ICD-10-CM

## 2019-08-14 PROCEDURE — 99215 OFFICE O/P EST HI 40 MIN: CPT | Performed by: INTERNAL MEDICINE

## 2019-08-15 ENCOUNTER — HOSPITAL ENCOUNTER (INPATIENT)
Facility: HOSPITAL | Age: 34
LOS: 5 days | Discharge: HOME | End: 2019-08-20
Attending: OBSTETRICS & GYNECOLOGY | Admitting: OBSTETRICS & GYNECOLOGY
Payer: COMMERCIAL

## 2019-08-15 ENCOUNTER — APPOINTMENT (OUTPATIENT)
Dept: OBGYN CLINIC | Facility: HOSPITAL | Age: 34
End: 2019-08-15
Payer: COMMERCIAL

## 2019-08-15 PROBLEM — Z34.90 PREGNANCY: Status: ACTIVE | Noted: 2019-08-15

## 2019-08-15 PROCEDURE — 3E0P7VZ INTRODUCTION OF HORMONE INTO FEMALE REPRODUCTIVE, VIA NATURAL OR ARTIFICIAL OPENING: ICD-10-PCS | Performed by: OBSTETRICS & GYNECOLOGY

## 2019-08-15 RX ORDER — TRISODIUM CITRATE DIHYDRATE AND CITRIC ACID MONOHYDRATE 500; 334 MG/5ML; MG/5ML
30 SOLUTION ORAL AS NEEDED
Status: COMPLETED | OUTPATIENT
Start: 2019-08-15 | End: 2019-08-17

## 2019-08-15 RX ORDER — IBUPROFEN 600 MG/1
600 TABLET ORAL ONCE AS NEEDED
Status: DISCONTINUED | OUTPATIENT
Start: 2019-08-15 | End: 2019-08-17

## 2019-08-15 RX ORDER — DEXTROSE, SODIUM CHLORIDE, SODIUM LACTATE, POTASSIUM CHLORIDE, AND CALCIUM CHLORIDE 5; .6; .31; .03; .02 G/100ML; G/100ML; G/100ML; G/100ML; G/100ML
INJECTION, SOLUTION INTRAVENOUS AS NEEDED
Status: DISCONTINUED | OUTPATIENT
Start: 2019-08-15 | End: 2019-08-17

## 2019-08-15 RX ORDER — TERBUTALINE SULFATE 1 MG/ML
0.25 INJECTION, SOLUTION SUBCUTANEOUS AS NEEDED
Status: DISCONTINUED | OUTPATIENT
Start: 2019-08-15 | End: 2019-08-17

## 2019-08-15 RX ORDER — AMMONIA INHALANTS 0.04 G/.3ML
0.3 INHALANT RESPIRATORY (INHALATION) AS NEEDED
Status: DISCONTINUED | OUTPATIENT
Start: 2019-08-15 | End: 2019-08-17

## 2019-08-15 RX ORDER — SODIUM CHLORIDE, SODIUM LACTATE, POTASSIUM CHLORIDE, CALCIUM CHLORIDE 600; 310; 30; 20 MG/100ML; MG/100ML; MG/100ML; MG/100ML
INJECTION, SOLUTION INTRAVENOUS CONTINUOUS
Status: DISCONTINUED | OUTPATIENT
Start: 2019-08-16 | End: 2019-08-17

## 2019-08-16 LAB
ANTIBODY SCREEN: NEGATIVE
DEPRECATED RDW RBC AUTO: 44.6 FL (ref 35.1–46.3)
ERYTHROCYTE [DISTWIDTH] IN BLOOD BY AUTOMATED COUNT: 12.9 % (ref 11–15)
HCT VFR BLD AUTO: 35.4 % (ref 35–48)
HGB BLD-MCNC: 12.4 G/DL (ref 12–16)
MCH RBC QN AUTO: 33 PG (ref 26–34)
MCHC RBC AUTO-ENTMCNC: 35 G/DL (ref 31–37)
MCV RBC AUTO: 94.1 FL (ref 80–100)
PLATELET # BLD AUTO: 142 10(3)UL (ref 150–450)
RBC # BLD AUTO: 3.76 X10(6)UL (ref 3.8–5.3)
RH BLOOD TYPE: POSITIVE
T PALLIDUM AB SER QL IA: NONREACTIVE
WBC # BLD AUTO: 7.7 X10(3) UL (ref 4–11)

## 2019-08-16 PROCEDURE — 99254 IP/OBS CNSLTJ NEW/EST MOD 60: CPT | Performed by: INTERNAL MEDICINE

## 2019-08-16 NOTE — H&P
[facility]  History & Physical    Guero Cornea Patient Status:  Inpatient    10/14/1985 MRN [id number]   Location [facility] [facility] Attending Princess Iris MD    1 PCP Manjinder Sky M.D.     Justin Soto: patient is here for IOL    LITTLEJOHN Multiple Vitamins-Minerals (TAB-A-ROSENDO MAXIMUM) Oral Tab Take 1 tablet by mouth daily. Disp:  Rfl:  8/14/2019 at Unknown time     Allergies:   Dust Mite Extract       OTHER (SEE COMMENTS)    OBJECTIVE:    Temp:  [96.7 °F (35.9 °C)-97.8 °F (36.6 °C)] 96. 7

## 2019-08-16 NOTE — CONSULTS
Reason for Consultation:       Chief Complaint   Patient presents with   • Follow-up       f/u on PVC' she been off the acebutolol-doing well she will be induced at [address] [place] [address]  Assessment/Plan:  Beverly Quinn is a 28year old woman with PVCs, (See Comments)         Medications:  Medications          Current Outpatient Medications   Medication Sig Dispense Refill   • OMEGA 3-6-9 FATTY ACIDS PO         • acebutolol (SECTRAL) 200 MG capsule Take 1 capsule by mouth daily.  90 capsule 1   • metoPROLO Potassium, Serum 04/14/2016 3.7  mEq/L Final   • Sodium 04/14/2016 139  mEq/L Final   • Chloride 04/14/2016 99  mEq/L Final   • Calcium 04/14/2016 9.2  mg/dL Final   • Magnesium 04/14/2016 1.7  mg/dL Final   • Albumin 04/14/2016 3.9  g/dL Final   • Alkalin

## 2019-08-16 NOTE — HISTORICAL OFFICE NOTE
Ericka Shaw MD - 2019 2:45 PM CDT  Formatting of this note might be different from the original.  [facility]  Clinic Note    Ashkan Berman  : 10/14/1985  PCP: Pcp Not In System    Reason for Consultation:  Chief Alethia Kayser Allergen Reactions   • Dust Mite Extract Other (See Comments)     Medications:  Current Outpatient Medications   Medication Sig Dispense Refill   • OMEGA 3-6-9 FATTY ACIDS PO   • acebutolol (SECTRAL) 200 MG capsule Take 1 capsule by mouth daily.  90 capsu Final   • Sodium 04/14/2016 139 mEq/L Final   • Chloride 04/14/2016 99 mEq/L Final   • Calcium 04/14/2016 9.2 mg/dL Final   • Magnesium 04/14/2016 1.7 mg/dL Final   • Albumin 04/14/2016 3.9 g/dL Final   • Alkaline Phosphatate(Alk Phos) 04/14/2016 58 20 - 1

## 2019-08-16 NOTE — PROGRESS NOTES
Pt  EDC / presents to L&D for scheduled IOL. Pt denies contractions, leaking of fluid, vag bleeding, states + fetal movement. Pt labels verified per pt and this RN. POC discussed. Consents explained and signed. EFM tested and applied.

## 2019-08-17 ENCOUNTER — ANESTHESIA EVENT (OUTPATIENT)
Dept: OBGYN UNIT | Facility: HOSPITAL | Age: 34
End: 2019-08-17
Payer: COMMERCIAL

## 2019-08-17 ENCOUNTER — ANESTHESIA (OUTPATIENT)
Dept: OBGYN UNIT | Facility: HOSPITAL | Age: 34
End: 2019-08-17
Payer: COMMERCIAL

## 2019-08-17 PROCEDURE — 59510 CESAREAN DELIVERY: CPT | Performed by: OBSTETRICS & GYNECOLOGY

## 2019-08-17 RX ORDER — SIMETHICONE 80 MG
80 TABLET,CHEWABLE ORAL 3 TIMES DAILY PRN
Status: DISCONTINUED | OUTPATIENT
Start: 2019-08-17 | End: 2019-08-20

## 2019-08-17 RX ORDER — IBUPROFEN 600 MG/1
600 TABLET ORAL EVERY 6 HOURS SCHEDULED
Status: DISCONTINUED | OUTPATIENT
Start: 2019-08-18 | End: 2019-08-20

## 2019-08-17 RX ORDER — DIPHENHYDRAMINE HYDROCHLORIDE 50 MG/ML
12.5 INJECTION INTRAMUSCULAR; INTRAVENOUS EVERY 4 HOURS PRN
Status: DISCONTINUED | OUTPATIENT
Start: 2019-08-17 | End: 2019-08-20

## 2019-08-17 RX ORDER — ONDANSETRON 2 MG/ML
4 INJECTION INTRAMUSCULAR; INTRAVENOUS EVERY 6 HOURS PRN
Status: DISCONTINUED | OUTPATIENT
Start: 2019-08-17 | End: 2019-08-20

## 2019-08-17 RX ORDER — NALBUPHINE HCL 10 MG/ML
2.5 AMPUL (ML) INJECTION EVERY 4 HOURS PRN
Status: DISCONTINUED | OUTPATIENT
Start: 2019-08-17 | End: 2019-08-20

## 2019-08-17 RX ORDER — DIPHENHYDRAMINE HCL 25 MG
25 CAPSULE ORAL EVERY 4 HOURS PRN
Status: DISCONTINUED | OUTPATIENT
Start: 2019-08-17 | End: 2019-08-20

## 2019-08-17 RX ORDER — ZOLPIDEM TARTRATE 5 MG/1
5 TABLET ORAL NIGHTLY PRN
Status: DISCONTINUED | OUTPATIENT
Start: 2019-08-17 | End: 2019-08-20

## 2019-08-17 RX ORDER — DOCUSATE SODIUM 100 MG/1
100 CAPSULE, LIQUID FILLED ORAL
Status: DISCONTINUED | OUTPATIENT
Start: 2019-08-18 | End: 2019-08-20

## 2019-08-17 RX ORDER — MISOPROSTOL 100 UG/1
25 TABLET ORAL
Status: DISCONTINUED | OUTPATIENT
Start: 2019-08-17 | End: 2019-08-17

## 2019-08-17 RX ORDER — NALOXONE HYDROCHLORIDE 0.4 MG/ML
0.08 INJECTION, SOLUTION INTRAMUSCULAR; INTRAVENOUS; SUBCUTANEOUS
Status: ACTIVE | OUTPATIENT
Start: 2019-08-17 | End: 2019-08-18

## 2019-08-17 RX ORDER — HYDROCODONE BITARTRATE AND ACETAMINOPHEN 5; 325 MG/1; MG/1
1 TABLET ORAL EVERY 4 HOURS PRN
Status: DISCONTINUED | OUTPATIENT
Start: 2019-08-17 | End: 2019-08-20

## 2019-08-17 RX ORDER — HYDROCODONE BITARTRATE AND ACETAMINOPHEN 10; 325 MG/1; MG/1
1 TABLET ORAL EVERY 4 HOURS PRN
Status: DISCONTINUED | OUTPATIENT
Start: 2019-08-17 | End: 2019-08-20

## 2019-08-17 RX ORDER — CEFAZOLIN SODIUM/WATER 2 G/20 ML
2 SYRINGE (ML) INTRAVENOUS ONCE
Status: DISCONTINUED | OUTPATIENT
Start: 2019-08-17 | End: 2019-08-17

## 2019-08-17 RX ORDER — BISACODYL 10 MG
10 SUPPOSITORY, RECTAL RECTAL
Status: DISCONTINUED | OUTPATIENT
Start: 2019-08-17 | End: 2019-08-20

## 2019-08-17 RX ORDER — DEXTROSE, SODIUM CHLORIDE, SODIUM LACTATE, POTASSIUM CHLORIDE, AND CALCIUM CHLORIDE 5; .6; .31; .03; .02 G/100ML; G/100ML; G/100ML; G/100ML; G/100ML
INJECTION, SOLUTION INTRAVENOUS CONTINUOUS
Status: DISCONTINUED | OUTPATIENT
Start: 2019-08-17 | End: 2019-08-20

## 2019-08-17 RX ORDER — TRISODIUM CITRATE DIHYDRATE AND CITRIC ACID MONOHYDRATE 500; 334 MG/5ML; MG/5ML
30 SOLUTION ORAL ONCE
Status: DISCONTINUED | OUTPATIENT
Start: 2019-08-17 | End: 2019-08-17

## 2019-08-17 NOTE — PROGRESS NOTES
Sleeping, had some contractions tjhat were stronger  [id number], Category I  Monitor with contractions 2-3 minutes  Stop pitocin  She wishes to eat. Will wait two hors, start Cytotec protocol.

## 2019-08-17 NOTE — PROGRESS NOTES
sve 1/50/-2   Sl softer, some bloody show  fht 130 reactive  Still with irreg contractions  25 micrograms of cytotec placed   Will recheck in 3 hours

## 2019-08-17 NOTE — PLAN OF CARE
Problem: BIRTH - VAGINAL/ SECTION  Goal: Fetal and maternal status remain reassuring during the birth process  Description  INTERVENTIONS:  - Monitor vital signs  - Monitor fetal heart rate  - Monitor uterine activity  - Monitor labor progression 8/16/2019 2031 by Amador Maldonado RN  Outcome: Progressing  8/16/2019 2025 by Amador Maldonado RN  Outcome: Progressing  Goal: Patient/Family Short Term Goal  Description  Patient's Short Term Goal: safe vaginal delivery    Interventions:   - follow p

## 2019-08-17 NOTE — PROGRESS NOTES
Contractions 2-3 minutes, pitocin since 1300. Cx:long/closed  [id number], Category I  CPM, may go to Cytotec after 12 hours.

## 2019-08-17 NOTE — PROGRESS NOTES
Pt states increase in contractions 1-2/10 pain scale    cytotec 25 mcg per vagina replaced as is feeling contractions now    sve 1/50/-2-no appreciable change  fht 150s reactive    Again reviewed with pt  Will recheck in 3 hours and see if changed  D/w her

## 2019-08-17 NOTE — PROGRESS NOTES
sve 1/50/-2  No change   fht 140s reactive  ctxns q 2-4 min palp mild to mod    D/w them recommend to move to abdominal delivery  Concerns re: atony and no progression reviewed  They wish to discuss this privately.

## 2019-08-18 LAB
BASOPHILS # BLD AUTO: 0.03 X10(3) UL (ref 0–0.2)
BASOPHILS NFR BLD AUTO: 0.2 %
DEPRECATED RDW RBC AUTO: 44.5 FL (ref 35.1–46.3)
EOSINOPHIL # BLD AUTO: 0.04 X10(3) UL (ref 0–0.7)
EOSINOPHIL NFR BLD AUTO: 0.3 %
ERYTHROCYTE [DISTWIDTH] IN BLOOD BY AUTOMATED COUNT: 12.7 % (ref 11–15)
HCT VFR BLD AUTO: 32.3 % (ref 35–48)
HGB BLD-MCNC: 11 G/DL (ref 12–16)
IMM GRANULOCYTES # BLD AUTO: 0.03 X10(3) UL (ref 0–1)
IMM GRANULOCYTES NFR BLD: 0.2 %
LYMPHOCYTES # BLD AUTO: 0.92 X10(3) UL (ref 1–4)
LYMPHOCYTES NFR BLD AUTO: 7.5 %
MCH RBC QN AUTO: 32.9 PG (ref 26–34)
MCHC RBC AUTO-ENTMCNC: 34.1 G/DL (ref 31–37)
MCV RBC AUTO: 96.7 FL (ref 80–100)
MONOCYTES # BLD AUTO: 0.85 X10(3) UL (ref 0.1–1)
MONOCYTES NFR BLD AUTO: 7 %
NEUTROPHILS # BLD AUTO: 10.32 X10 (3) UL (ref 1.5–7.7)
NEUTROPHILS # BLD AUTO: 10.32 X10(3) UL (ref 1.5–7.7)
NEUTROPHILS NFR BLD AUTO: 84.8 %
PLATELET # BLD AUTO: 126 10(3)UL (ref 150–450)
RBC # BLD AUTO: 3.34 X10(6)UL (ref 3.8–5.3)
WBC # BLD AUTO: 12.2 X10(3) UL (ref 4–11)

## 2019-08-18 RX ORDER — KETOROLAC TROMETHAMINE 30 MG/ML
INJECTION, SOLUTION INTRAMUSCULAR; INTRAVENOUS
Status: DISPENSED
Start: 2019-08-18 | End: 2019-08-18

## 2019-08-18 RX ORDER — KETOROLAC TROMETHAMINE 30 MG/ML
30 INJECTION, SOLUTION INTRAMUSCULAR; INTRAVENOUS EVERY 6 HOURS PRN
Status: DISPENSED | OUTPATIENT
Start: 2019-08-18 | End: 2019-08-20

## 2019-08-18 NOTE — PROGRESS NOTES
Pt transferred  to Mother Baby room [phone] in stable condition. Report given to MARY Parr. Infant transferred with mother in stable condition.

## 2019-08-18 NOTE — ANESTHESIA POSTPROCEDURE EVALUATION
[address] [place] [address] Patient Status:  Inpatient   Age/Gender 28year old female MRN [id number]   Location [facility] Attending Clarissa Varma, [address] [facility] Se Day # 2 PCP Jacqueline Knowles M.D.        Anesthesia Post-op Note    * No

## 2019-08-18 NOTE — OPERATIVE REPORT
[facility]   Section - Operative Note    Judith Thomas Patient Status:  Inpatient    10/14/1985 MRN [id number]   Location 1818 [facility] Attending Richa Durand MD   Hosp Day # 2 PCP Arik Santos M.D.    Preoperative Kya Amaya 1 chromicl. A second imbricating layer was placed with 1 chromic. The incision was inspected for hemostasis. Re-inspection of the hysterotomy, peritomeum and rectus muscles noted them to be entirely hemostatic.    The parietal peritoneum and rectus muscl

## 2019-08-18 NOTE — PROGRESS NOTES
[facility]    Patients Name: Ashkan Berman  Attending Physician: Ibis Bartholomew MD  CSN: [id number]    Location:  1111/1111-A  MRN: [id number]    YOB: 1985  Admission Date: 8/15/2019     Obstetric Anesthesia Pain Progress Note    Post

## 2019-08-18 NOTE — PLAN OF CARE
Problem: SAFETY ADULT - FALL  Goal: Free from fall injury  Description  INTERVENTIONS:  - Assess pt frequently for physical needs  - Identify cognitive and physical deficits and behaviors that affect risk of falls.   - [place] fall precautions as indica

## 2019-08-18 NOTE — PLAN OF CARE
Problem: SAFETY ADULT - FALL  Goal: Free from fall injury  Description  INTERVENTIONS:  - Assess pt frequently for physical needs  - Identify cognitive and physical deficits and behaviors that affect risk of falls.   - [place] fall precautions as indica antiemetic medications  - Provide nonpharmacologic comfort measures as appropriate  - Advance diet as tolerated, if ordered  - Obtain nutritional consult as needed  - Evaluate fluid balance  Outcome: Progressing  Goal: Maintains or returns to baseline nikolay Provide VTE prophylaxis as needed. - Monitor bowel function.  - Encourage ambulation and provide assistance as needed. - Assess and monitor emotional status and provide social service/psych resources as needed.   - Utilize standard precautions and use per feeding support. Outcome: Progressing  Goal: Appropriate maternal -  bonding  Description  INTERVENTIONS:  - Assess caregiver- interactions. - Assess caregiver's emotional status and coping mechanisms.   - Encourage caregiver to participate

## 2019-08-18 NOTE — PROGRESS NOTES
[facility]  Post-Partum Caesarean Section Progress Note    Kathi Ofe Patient Status:  Inpatient    10/14/1985 MRN [id number]   [facility] 1SW-J Attending Mello Lyons MD   Hosp Day # 3 PCP Aden Alvarez M.D.     Kishan Hyatt:

## 2019-08-18 NOTE — PROGRESS NOTES
NURSING ADMISSION NOTE      Patient admitted via cart   ID bands verified with labor RN. Oriented to room, care of plan reviewed and patient education discussed. Safety precautions initiated. Bed in low position. Call light in reach.

## 2019-08-18 NOTE — PROGRESS NOTES
Pt's BP 91/49. Pt refusing Lopressor at this time. Dr. Christen Liu notified. She deferred to cardiology. Dr. Jackie Alston (covering for Dr. Natali Larkin) notified. Order to decrease dose to 12.5mg BID and hold if systolic TI<70. Med held for now since BP was 91/49.  Pt s

## 2019-08-18 NOTE — PROGRESS NOTES
Pt and  are agreeable with deilivery    Cardiologist notified, post op care reviewed    Benjamin and anesthesia notified

## 2019-08-18 NOTE — ANESTHESIA PREPROCEDURE EVALUATION
PRE-OP EVALUATION    Patient Name: Humera Cifuentes    Pre-op Diagnosis: * No pre-op diagnosis entered *    * No procedures listed *    * No surgeons found in log *    Pre-op vitals reviewed.   Temp: 97.9 °F (36.6 °C)  Pulse: 59  Resp: 16  BP: 103/57 summary reviewed. Anesthetic Complications           GI/Hepatic/Renal    Negative GI/hepatic/renal ROS.                              Cardiovascular                                    (+) dysrhythmias and SVT                  Endo/Other

## 2019-08-18 NOTE — BRIEF OP NOTE
Pre-Operative Diagnosis: iup 37w3d, iugr, failed induction of labor   Post-Operative Diagnosis: same   Procedure Performed:   Procedure(s): primary low transverse  section with delivery of 4# 12 oz female 9,9      Surgeon(s) and Role:     Chadwick Fierro

## 2019-08-19 ENCOUNTER — TELEPHONE (OUTPATIENT)
Dept: OBGYN CLINIC | Facility: CLINIC | Age: 34
End: 2019-08-19

## 2019-08-19 RX ORDER — ACETAMINOPHEN 325 MG/1
650 TABLET ORAL EVERY 6 HOURS PRN
Status: DISCONTINUED | OUTPATIENT
Start: 2019-08-19 | End: 2019-08-20

## 2019-08-19 NOTE — PROGRESS NOTES
POD#2  No C/O  Afebrile, VS table  Minimal bleeding  Ambulating, tolerating PO, voiding without difficulty  Stable  Home tomorrow

## 2019-08-19 NOTE — PLAN OF CARE
Problem: SAFETY ADULT - FALL  Goal: Free from fall injury  Description  INTERVENTIONS:  - Assess pt frequently for physical needs  - Identify cognitive and physical deficits and behaviors that affect risk of falls.   - Stewart fall precautions as indica antiemetic medications  - Provide nonpharmacologic comfort measures as appropriate  - Advance diet as tolerated, if ordered  - Obtain nutritional consult as needed  - Evaluate fluid balance  Outcome: Progressing  Goal: Maintains or returns to baseline nikolay Provide VTE prophylaxis as needed. - Monitor bowel function.  - Encourage ambulation and provide assistance as needed. - Assess and monitor emotional status and provide social service/psych resources as needed.   - Utilize standard precautions and use per feeding support. Outcome: Progressing  Goal: Appropriate maternal -  bonding  Description  INTERVENTIONS:  - Assess caregiver- interactions. - Assess caregiver's emotional status and coping mechanisms.   - Encourage caregiver to participate

## 2019-08-20 VITALS
BODY MASS INDEX: 27.26 KG/M2 | WEIGHT: 161.63 LBS | HEART RATE: 51 BPM | SYSTOLIC BLOOD PRESSURE: 117 MMHG | HEIGHT: 64.5 IN | TEMPERATURE: 98 F | RESPIRATION RATE: 18 BRPM | OXYGEN SATURATION: 98 % | DIASTOLIC BLOOD PRESSURE: 71 MMHG

## 2019-08-20 RX ORDER — HYDROCODONE BITARTRATE AND ACETAMINOPHEN 5; 325 MG/1; MG/1
1 TABLET ORAL EVERY 6 HOURS PRN
Qty: 12 TABLET | Refills: 0 | Status: SHIPPED | OUTPATIENT
Start: 2019-08-20 | End: 2019-09-24 | Stop reason: ALTCHOICE

## 2019-08-20 NOTE — PROGRESS NOTES
[facility]  Post-Partum Caesarean Section Progress Note    John Schaefer Patient Status:  Inpatient    10/14/1985 MRN [id number]   [place] 1SW-J Attending Kenneth Connors MD   [facility] Day # 5 PCP Nely Cain M.D.     Gustavo Pena:

## 2019-08-20 NOTE — PLAN OF CARE
Problem: CARDIOVASCULAR - ADULT  Goal: Maintains optimal cardiac output and hemodynamic stability  Description  INTERVENTIONS:  - Monitor vital signs, rhythm, and trends  - Monitor for bleeding, hypotension and signs of decreased cardiac output  - Evalua Provide/instruct/assist with pericare as needed. - Provide VTE prophylaxis as needed. - Monitor bowel function.  - Encourage ambulation and provide assistance as needed.   - Assess and monitor emotional status and provide social service/psych resources as handouts and information on community breast feeding support. Outcome: Progressing  Goal: Appropriate maternal -  bonding  Description  INTERVENTIONS:  - Assess caregiver- interactions.   - Assess caregiver's emotional status and coping [facility]

## 2019-08-20 NOTE — PLAN OF CARE
Problem: CARDIOVASCULAR - ADULT  Goal: Maintains optimal cardiac output and hemodynamic stability  Description  INTERVENTIONS:  - Monitor vital signs, rhythm, and trends  - Monitor for bleeding, hypotension and signs of decreased cardiac output  - Evalua Provide/instruct/assist with pericare as needed. - Provide VTE prophylaxis as needed. - Monitor bowel function.  - Encourage ambulation and provide assistance as needed.   - Assess and monitor emotional status and provide social service/psych resources as handouts and information on community breast feeding support. Outcome: Completed  Goal: Appropriate maternal -  bonding  Description  INTERVENTIONS:  - Assess caregiver- interactions.   - Assess caregiver's emotional status and coping mechan

## 2019-08-20 NOTE — DISCHARGE SUMMARY
[facility]  Discharge Summary    Shauna Medina Patient Status:  inpatient    10/14/1985 MRN [id number]   Location 1111/1111-A Attending [facility] Jluis Major Day # 5 PCP Jessica Liang M.D.      Date of Admission: 8/15/2019    Date of Discharge:  Follow up Visits:  Follow-up with [facility] in 2 weeks

## 2019-08-22 ENCOUNTER — TELEPHONE (OUTPATIENT)
Dept: OBGYN UNIT | Facility: HOSPITAL | Age: 34
End: 2019-08-22

## 2019-08-23 ENCOUNTER — TELEPHONE (OUTPATIENT)
Dept: OBGYN UNIT | Facility: HOSPITAL | Age: 34
End: 2019-08-23

## 2019-08-24 ENCOUNTER — TELEPHONE (OUTPATIENT)
Dept: OBGYN CLINIC | Facility: CLINIC | Age: 34
End: 2019-08-24

## 2019-08-24 NOTE — TELEPHONE ENCOUNTER
Received mail authorization from [facility] for admission at [facility].   Begin Date: 8/15/19  End Date: 8/20/19  Subscriber ID: [id number]  Request ID: [id number]

## 2019-08-30 ENCOUNTER — POSTPARTUM (OUTPATIENT)
Dept: OBGYN CLINIC | Facility: CLINIC | Age: 34
End: 2019-08-30
Payer: COMMERCIAL

## 2019-08-30 VITALS
DIASTOLIC BLOOD PRESSURE: 64 MMHG | SYSTOLIC BLOOD PRESSURE: 102 MMHG | HEART RATE: 65 BPM | WEIGHT: 146 LBS | BODY MASS INDEX: 25 KG/M2

## 2019-08-30 DIAGNOSIS — O34.211 MATERNAL CARE DUE TO LOW TRANSVERSE UTERINE SCAR FROM PREVIOUS CESAREAN DELIVERY: Primary | ICD-10-CM

## 2019-08-30 PROBLEM — O41.8X10 SUBCHORIONIC HEMATOMA IN FIRST TRIMESTER: Status: RESOLVED | Noted: 2019-03-04 | Resolved: 2019-08-30

## 2019-08-30 PROBLEM — Z34.93 ENCOUNTER FOR SUPERVISION OF NORMAL PREGNANCY IN THIRD TRIMESTER: Status: RESOLVED | Noted: 2019-04-04 | Resolved: 2019-08-30

## 2019-08-30 PROBLEM — Z34.90 PREGNANCY: Status: RESOLVED | Noted: 2019-08-15 | Resolved: 2019-08-30

## 2019-08-30 PROBLEM — O46.8X1 SUBCHORIONIC HEMATOMA IN FIRST TRIMESTER: Status: RESOLVED | Noted: 2019-03-04 | Resolved: 2019-08-30

## 2019-08-30 PROCEDURE — 99024 POSTOP FOLLOW-UP VISIT: CPT | Performed by: NURSE PRACTITIONER

## 2019-08-30 NOTE — PROGRESS NOTES
[facility]  Obstetrics and Gynecology   Postpartum Progress Note    Subjective:     Humera Cifuentes is a 28year old  female who is s/p PLTCS on 2019. Her pregnancy was complicated by IUGR and DE [facility]. She reports doing well.  Baby is d understanding and agrees with the plan of care. All questions were answered to the best of my ability at this time.     RTC in 4 weeks for postpartum visit sooner if needed     [address]

## 2019-09-04 ENCOUNTER — TELEPHONE (OUTPATIENT)
Dept: OBGYN CLINIC | Facility: CLINIC | Age: 34
End: 2019-09-04

## 2019-09-04 NOTE — TELEPHONE ENCOUNTER
Called regarding bleeding. It had stopped last week and resumed again. Pt got concerned  Not soaking through the pads  No clots  Sounds like normal pp bleeding  reassured that bleeding pattern will fluctuate for 6 weeks pos top.

## 2019-09-06 ENCOUNTER — MED REC SCAN ONLY (OUTPATIENT)
Dept: OBGYN CLINIC | Facility: CLINIC | Age: 34
End: 2019-09-06

## 2019-09-24 ENCOUNTER — POSTPARTUM (OUTPATIENT)
Dept: OBGYN CLINIC | Facility: CLINIC | Age: 34
End: 2019-09-24
Payer: COMMERCIAL

## 2019-09-24 ENCOUNTER — TELEPHONE (OUTPATIENT)
Dept: OBGYN CLINIC | Facility: CLINIC | Age: 34
End: 2019-09-24

## 2019-09-24 VITALS
HEART RATE: 62 BPM | DIASTOLIC BLOOD PRESSURE: 64 MMHG | BODY MASS INDEX: 24.62 KG/M2 | WEIGHT: 146 LBS | SYSTOLIC BLOOD PRESSURE: 112 MMHG | HEIGHT: 64.5 IN

## 2019-09-24 DIAGNOSIS — N81.89 PELVIC FLOOR WEAKNESS IN FEMALE: Primary | ICD-10-CM

## 2019-09-24 NOTE — PROGRESS NOTES
GYNE postpartum note     S: patient is a 31yo  who presents today for post partum visit. She underwent ltcs on 08/17/19. She is doing well, still breast feeding . Has no complaints.  Bleeding is minimal now   Denies any depressed mood, anxiety, no SI/HI

## 2019-09-24 NOTE — TELEPHONE ENCOUNTER
Wants referral to [facility] for pelvic post partum strengthening and scar reduction. Order placed, printed and faxed to [facility]. Patient notified via [employer].

## 2019-11-12 NOTE — TELEPHONE ENCOUNTER
Form came in from [facility] to be signed and put in Dr Warner Crimes bin in [address],[facility]     Thank you

## 2019-11-18 ENCOUNTER — OFFICE VISIT (OUTPATIENT)
Dept: CARDIOLOGY | Age: 34
End: 2019-11-18

## 2019-11-18 VITALS
BODY MASS INDEX: 23.66 KG/M2 | DIASTOLIC BLOOD PRESSURE: 60 MMHG | HEART RATE: 64 BPM | WEIGHT: 142 LBS | HEIGHT: 65 IN | SYSTOLIC BLOOD PRESSURE: 118 MMHG

## 2019-11-18 DIAGNOSIS — I47.20 VENTRICULAR TACHYCARDIA (CMD): ICD-10-CM

## 2019-11-18 DIAGNOSIS — I49.3 PVC'S (PREMATURE VENTRICULAR CONTRACTIONS): Primary | ICD-10-CM

## 2019-11-18 PROCEDURE — 99214 OFFICE O/P EST MOD 30 MIN: CPT | Performed by: INTERNAL MEDICINE

## 2019-11-18 ASSESSMENT — PATIENT HEALTH QUESTIONNAIRE - PHQ9
SUM OF ALL RESPONSES TO PHQ9 QUESTIONS 1 AND 2: 0
1. LITTLE INTEREST OR PLEASURE IN DOING THINGS: NOT AT ALL
SUM OF ALL RESPONSES TO PHQ9 QUESTIONS 1 AND 2: 0
2. FEELING DOWN, DEPRESSED OR HOPELESS: NOT AT ALL

## 2019-11-19 ENCOUNTER — TELEPHONE (OUTPATIENT)
Dept: OBGYN CLINIC | Facility: CLINIC | Age: 34
End: 2019-11-19

## 2019-11-19 NOTE — TELEPHONE ENCOUNTER
Received progress note from  [facility] and needs Dr. Alesia Sexton signature. Notes put in Dr. Sebastien tolentino for review and signature.

## 2019-11-19 NOTE — TELEPHONE ENCOUNTER
Reviewed by Dr. Jeet Drummond and signed.      Faxed back to [facility] at [phone]   Copy to file in [address]

## 2020-01-01 ENCOUNTER — EXTERNAL RECORD (OUTPATIENT)
Dept: OTHER | Age: 35
End: 2020-01-01

## 2020-01-13 ENCOUNTER — TELEPHONE (OUTPATIENT)
Dept: OBGYN CLINIC | Facility: CLINIC | Age: 35
End: 2020-01-13

## 2020-01-13 NOTE — TELEPHONE ENCOUNTER
27year old patient s/p LTCS on 8/17/19 complaining of clogged milk duct. Has had symptoms for 2-3 days. Does feel it has improved this morning. Lump is still there, but it is no longer painful.  Denies any fever or redness to the breast.  Last OV date: 9/

## 2020-01-28 NOTE — TELEPHONE ENCOUNTER
Received order for [facility] lactation. Order signed and faxed to [phone].

## 2020-02-28 ENCOUNTER — TELEPHONE (OUTPATIENT)
Dept: CARDIOLOGY | Age: 35
End: 2020-02-28

## 2020-02-28 DIAGNOSIS — I47.20 VENTRICULAR TACHYCARDIA (CMD): ICD-10-CM

## 2020-02-28 DIAGNOSIS — I49.3 PVC'S (PREMATURE VENTRICULAR CONTRACTIONS): Primary | ICD-10-CM

## 2020-03-17 LAB
ALBUMIN SERPL-MCNC: 4.6 G/DL
ALBUMIN/GLOB SERPL: NORMAL {RATIO}
ALP SERPL-CCNC: 66 U/L
ALT SERPL-CCNC: 27 U/L
ANION GAP SERPL CALC-SCNC: 13 MMOL/L
AST SERPL-CCNC: 34 U/L
BILIRUB SERPL-MCNC: 1 MG/DL
BUN SERPL-MCNC: 14 MG/DL
BUN/CREAT SERPL: NORMAL
CALCIUM SERPL-MCNC: 9.7 MG/DL
CHLORIDE SERPL-SCNC: 103 MMOL/L
CHOLEST SERPL-MCNC: 161 MG/DL
CHOLEST/HDLC SERPL: 2.4 {RATIO}
CO2 SERPL-SCNC: 26 MMOL/L
CREAT SERPL-MCNC: 0.9 MG/DL
ERYTHROCYTE [DISTWIDTH] IN BLOOD: 13 %
GLOBULIN SER-MCNC: NORMAL G/DL
GLUCOSE SERPL-MCNC: 89 MG/DL
HCT VFR BLD CALC: 45.3 %
HDLC SERPL-MCNC: 68 MG/DL
HGB BLD-MCNC: 15 G/DL
LDLC SERPL CALC-MCNC: 75 MG/DL
LENGTH OF FAST TIME PATIENT: NORMAL H
LENGTH OF FAST TIME PATIENT: YES H
MCH RBC QN AUTO: 31 PG
MCHC RBC AUTO-ENTMCNC: 33 G/DL
MCV RBC AUTO: 94 FL
MPV (OFFPRE2): NORMAL
NONHDLC SERPL-MCNC: 93 MG/DL
PLATELET # BLD: 208 10*3/UL
POTASSIUM SERPL-SCNC: 4.3 MMOL/L
PROT SERPL-MCNC: 6.4 G/DL
RBC # BLD: 4.8 10*6/UL
SODIUM SERPL-SCNC: 142 MMOL/L
TRIGL SERPL-MCNC: 90 MG/DL
TSH SERPL-ACNC: 1.72 M[IU]/L
VLDLC SERPL CALC-MCNC: NORMAL MG/DL
WBC # BLD: 5 10*3/UL

## 2020-04-08 ENCOUNTER — E-ADVICE (OUTPATIENT)
Dept: CARDIOLOGY | Age: 35
End: 2020-04-08

## 2020-04-08 ENCOUNTER — CLINICAL ABSTRACT (OUTPATIENT)
Dept: CARDIOLOGY | Age: 35
End: 2020-04-08

## 2020-05-14 ENCOUNTER — E-ADVICE (OUTPATIENT)
Dept: CARDIOLOGY | Age: 35
End: 2020-05-14

## 2020-05-18 ENCOUNTER — APPOINTMENT (OUTPATIENT)
Dept: CARDIOLOGY | Age: 35
End: 2020-05-18

## 2020-05-20 ENCOUNTER — V-VISIT (OUTPATIENT)
Dept: CARDIOLOGY | Age: 35
End: 2020-05-20

## 2020-05-20 VITALS
SYSTOLIC BLOOD PRESSURE: 114 MMHG | HEIGHT: 65 IN | DIASTOLIC BLOOD PRESSURE: 58 MMHG | BODY MASS INDEX: 22.82 KG/M2 | TEMPERATURE: 98 F | HEART RATE: 77 BPM | WEIGHT: 137 LBS

## 2020-05-20 DIAGNOSIS — I47.20 VENTRICULAR TACHYCARDIA (CMD): ICD-10-CM

## 2020-05-20 DIAGNOSIS — I49.3 PVC'S (PREMATURE VENTRICULAR CONTRACTIONS): Primary | ICD-10-CM

## 2020-05-20 PROCEDURE — 99214 OFFICE O/P EST MOD 30 MIN: CPT | Performed by: INTERNAL MEDICINE

## 2020-05-20 ASSESSMENT — PATIENT HEALTH QUESTIONNAIRE - PHQ9
SUM OF ALL RESPONSES TO PHQ9 QUESTIONS 1 AND 2: 0
CLINICAL INTERPRETATION OF PHQ9 SCORE: NO FURTHER SCREENING NEEDED
1. LITTLE INTEREST OR PLEASURE IN DOING THINGS: NOT AT ALL
SUM OF ALL RESPONSES TO PHQ9 QUESTIONS 1 AND 2: 0
2. FEELING DOWN, DEPRESSED OR HOPELESS: NOT AT ALL
CLINICAL INTERPRETATION OF PHQ2 SCORE: NO FURTHER SCREENING NEEDED

## 2020-09-30 ENCOUNTER — OFFICE VISIT (OUTPATIENT)
Dept: CARDIOLOGY | Age: 35
End: 2020-09-30

## 2020-09-30 VITALS
HEIGHT: 65 IN | BODY MASS INDEX: 22.82 KG/M2 | SYSTOLIC BLOOD PRESSURE: 104 MMHG | WEIGHT: 137 LBS | HEART RATE: 50 BPM | DIASTOLIC BLOOD PRESSURE: 56 MMHG

## 2020-09-30 DIAGNOSIS — I49.3 PVC'S (PREMATURE VENTRICULAR CONTRACTIONS): Primary | ICD-10-CM

## 2020-09-30 DIAGNOSIS — I47.20 VENTRICULAR TACHYCARDIA (CMD): ICD-10-CM

## 2020-09-30 PROCEDURE — 99214 OFFICE O/P EST MOD 30 MIN: CPT | Performed by: INTERNAL MEDICINE

## 2020-09-30 PROCEDURE — 93000 ELECTROCARDIOGRAM COMPLETE: CPT | Performed by: INTERNAL MEDICINE

## 2020-09-30 ASSESSMENT — PATIENT HEALTH QUESTIONNAIRE - PHQ9
2. FEELING DOWN, DEPRESSED OR HOPELESS: NOT AT ALL
CLINICAL INTERPRETATION OF PHQ2 SCORE: NO FURTHER SCREENING NEEDED
CLINICAL INTERPRETATION OF PHQ9 SCORE: NO FURTHER SCREENING NEEDED
SUM OF ALL RESPONSES TO PHQ9 QUESTIONS 1 AND 2: 0
1. LITTLE INTEREST OR PLEASURE IN DOING THINGS: NOT AT ALL
SUM OF ALL RESPONSES TO PHQ9 QUESTIONS 1 AND 2: 0

## 2020-10-02 ENCOUNTER — TELEPHONE (OUTPATIENT)
Dept: CARDIOLOGY | Age: 35
End: 2020-10-02

## 2020-10-12 ENCOUNTER — APPOINTMENT (OUTPATIENT)
Dept: LAB | Facility: HOSPITAL | Age: 35
End: 2020-10-12
Attending: INTERNAL MEDICINE
Payer: COMMERCIAL

## 2020-10-12 DIAGNOSIS — Z01.818 PREOP EXAMINATION: ICD-10-CM

## 2020-10-12 DIAGNOSIS — Z11.59 ENCOUNTER FOR SCREENING FOR OTHER VIRAL DISEASES: ICD-10-CM

## 2020-10-12 LAB
SARS-COV-2 RNA SPEC QL NAA+PROBE: NOT DETECTED
SPECIMEN SOURCE: NORMAL

## 2020-10-15 ENCOUNTER — HOSPITAL ENCOUNTER (OUTPATIENT)
Dept: CV DIAGNOSTICS | Facility: HOSPITAL | Age: 35
Discharge: HOME | End: 2020-10-15
Attending: INTERNAL MEDICINE
Payer: COMMERCIAL

## 2020-10-15 DIAGNOSIS — I47.2 VENTRICULAR TACHYCARDIA (HCC): ICD-10-CM

## 2020-10-15 DIAGNOSIS — I49.3 PVC'S (PREMATURE VENTRICULAR CONTRACTIONS): ICD-10-CM

## 2020-10-15 PROCEDURE — 93017 CV STRESS TEST TRACING ONLY: CPT | Performed by: INTERNAL MEDICINE

## 2020-10-15 PROCEDURE — 93018 CV STRESS TEST I&R ONLY: CPT | Performed by: INTERNAL MEDICINE

## 2021-01-13 ENCOUNTER — TELEPHONE (OUTPATIENT)
Dept: CARDIOLOGY | Age: 36
End: 2021-01-13

## 2021-01-20 ENCOUNTER — OFFICE VISIT (OUTPATIENT)
Dept: CARDIOLOGY | Age: 36
End: 2021-01-20

## 2021-01-20 VITALS
DIASTOLIC BLOOD PRESSURE: 60 MMHG | WEIGHT: 135 LBS | BODY MASS INDEX: 22.49 KG/M2 | HEIGHT: 65 IN | SYSTOLIC BLOOD PRESSURE: 116 MMHG | HEART RATE: 60 BPM

## 2021-01-20 DIAGNOSIS — I47.20 VENTRICULAR TACHYCARDIA (CMD): ICD-10-CM

## 2021-01-20 DIAGNOSIS — I49.3 PVC'S (PREMATURE VENTRICULAR CONTRACTIONS): Primary | ICD-10-CM

## 2021-01-20 PROCEDURE — 99214 OFFICE O/P EST MOD 30 MIN: CPT | Performed by: INTERNAL MEDICINE

## 2021-02-20 ENCOUNTER — IMMUNIZATION (OUTPATIENT)
Dept: LAB | Age: 36
End: 2021-02-20

## 2021-02-20 DIAGNOSIS — Z23 NEED FOR VACCINATION: Primary | ICD-10-CM

## 2021-02-20 PROCEDURE — 91300 COVID 19 PFIZER-BIONTECH: CPT

## 2021-02-20 PROCEDURE — 0001A COVID 19 PFIZER-BIONTECH: CPT

## 2021-03-14 ENCOUNTER — IMMUNIZATION (OUTPATIENT)
Dept: LAB | Age: 36
End: 2021-03-14

## 2021-03-14 DIAGNOSIS — Z23 NEED FOR VACCINATION: Primary | ICD-10-CM

## 2021-03-14 PROCEDURE — 0002A COVID 19 PFIZER-BIONTECH: CPT

## 2021-03-14 PROCEDURE — 91300 COVID 19 PFIZER-BIONTECH: CPT

## 2021-11-02 ENCOUNTER — HOSPITAL ENCOUNTER (OUTPATIENT)
Dept: MRI IMAGING | Age: 36
Discharge: HOME | End: 2021-11-02
Attending: INTERNAL MEDICINE

## 2021-11-02 ENCOUNTER — LAB SERVICES (OUTPATIENT)
Dept: LAB | Age: 36
End: 2021-11-02

## 2021-11-02 DIAGNOSIS — I47.20 VENTRICULAR TACHYARRHYTHMIA (CMD): ICD-10-CM

## 2021-11-02 DIAGNOSIS — I49.3 PVC (PREMATURE VENTRICULAR CONTRACTION): ICD-10-CM

## 2021-11-02 LAB
HCT VFR BLD CALC: 43 % (ref 36–46.5)
HGB BLD-MCNC: 14.9 G/DL (ref 12–15.5)

## 2021-11-02 PROCEDURE — 75561 CARDIAC MRI FOR MORPH W/DYE: CPT

## 2021-11-02 PROCEDURE — 10002805 HB CONTRAST AGENT

## 2021-11-02 PROCEDURE — 75561 CARDIAC MRI FOR MORPH W/DYE: CPT | Performed by: INTERNAL MEDICINE

## 2021-11-02 PROCEDURE — A9585 GADOBUTROL INJECTION: HCPCS

## 2021-11-02 PROCEDURE — 85014 HEMATOCRIT: CPT | Performed by: INTERNAL MEDICINE

## 2021-11-02 PROCEDURE — 36415 COLL VENOUS BLD VENIPUNCTURE: CPT | Performed by: INTERNAL MEDICINE

## 2021-11-02 RX ORDER — GADOBUTROL 604.72 MG/ML
15 INJECTION INTRAVENOUS ONCE
Status: COMPLETED | OUTPATIENT
Start: 2021-11-02 | End: 2021-11-02

## 2021-11-02 RX ADMIN — GADOBUTROL 15 ML: 604.72 INJECTION INTRAVENOUS at 12:20

## 2021-11-15 ENCOUNTER — HOSPITAL ENCOUNTER (EMERGENCY)
Age: 36
Discharge: HOME | End: 2021-11-15
Attending: EMERGENCY MEDICINE

## 2021-11-15 VITALS
RESPIRATION RATE: 15 BRPM | DIASTOLIC BLOOD PRESSURE: 66 MMHG | SYSTOLIC BLOOD PRESSURE: 107 MMHG | OXYGEN SATURATION: 99 % | HEART RATE: 58 BPM | TEMPERATURE: 98 F

## 2021-11-15 DIAGNOSIS — I49.3 PVC'S (PREMATURE VENTRICULAR CONTRACTIONS): Primary | ICD-10-CM

## 2021-11-15 DIAGNOSIS — E83.42 HYPOMAGNESEMIA: ICD-10-CM

## 2021-11-15 LAB
ALBUMIN SERPL-MCNC: 3.4 G/DL (ref 3.6–5.1)
ALBUMIN/GLOB SERPL: 1.2 {RATIO} (ref 1–2.4)
ALP SERPL-CCNC: 55 UNITS/L (ref 45–117)
ALT SERPL-CCNC: 28 UNITS/L
ANION GAP SERPL CALC-SCNC: 15 MMOL/L (ref 10–20)
AST SERPL-CCNC: 20 UNITS/L
BASOPHILS # BLD: 0.1 K/MCL (ref 0–0.3)
BASOPHILS NFR BLD: 1 %
BILIRUB SERPL-MCNC: 0.6 MG/DL (ref 0.2–1)
BUN SERPL-MCNC: 17 MG/DL (ref 6–20)
BUN/CREAT SERPL: 22 (ref 7–25)
CALCIUM SERPL-MCNC: 8.1 MG/DL (ref 8.4–10.2)
CHLORIDE SERPL-SCNC: 105 MMOL/L (ref 98–107)
CO2 SERPL-SCNC: 24 MMOL/L (ref 21–32)
CREAT SERPL-MCNC: 0.79 MG/DL (ref 0.51–0.95)
DEPRECATED RDW RBC: 41.1 FL (ref 39–50)
EOSINOPHIL # BLD: 0.1 K/MCL (ref 0–0.5)
EOSINOPHIL NFR BLD: 1 %
ERYTHROCYTE [DISTWIDTH] IN BLOOD: 12.4 % (ref 11–15)
FASTING DURATION TIME PATIENT: ABNORMAL H
GFR SERPLBLD BASED ON 1.73 SQ M-ARVRAT: >90 ML/MIN
GLOBULIN SER-MCNC: 2.9 G/DL (ref 2–4)
GLUCOSE SERPL-MCNC: 109 MG/DL (ref 70–99)
HCG UR QL: NEGATIVE
HCT VFR BLD CALC: 41.2 % (ref 36–46.5)
HGB BLD-MCNC: 14.5 G/DL (ref 12–15.5)
IMM GRANULOCYTES # BLD AUTO: 0 K/MCL (ref 0–0.2)
IMM GRANULOCYTES # BLD: 0 %
LYMPHOCYTES # BLD: 1.4 K/MCL (ref 1–4.8)
LYMPHOCYTES NFR BLD: 19 %
MAGNESIUM SERPL-MCNC: 1.6 MG/DL (ref 1.7–2.4)
MCH RBC QN AUTO: 31.4 PG (ref 26–34)
MCHC RBC AUTO-ENTMCNC: 35.2 G/DL (ref 32–36.5)
MCV RBC AUTO: 89.2 FL (ref 78–100)
MONOCYTES # BLD: 0.5 K/MCL (ref 0.3–0.9)
MONOCYTES NFR BLD: 7 %
NEUTROPHILS # BLD: 5.2 K/MCL (ref 1.8–7.7)
NEUTROPHILS NFR BLD: 72 %
NRBC BLD MANUAL-RTO: 0 /100 WBC
P AXIS (DEGREES): 77
PLATELET # BLD AUTO: 198 K/MCL (ref 140–450)
POTASSIUM SERPL-SCNC: 3.8 MMOL/L (ref 3.4–5.1)
PR-INTERVAL (MSEC): 144
PROT SERPL-MCNC: 6.3 G/DL (ref 6.4–8.2)
QRS-INTERVAL (MSEC): 87
QT-INTERVAL (MSEC): 356
QTC: 393
R AXIS (DEGREES): 70
RBC # BLD: 4.62 MIL/MCL (ref 4–5.2)
REPORT TEXT: NORMAL
SODIUM SERPL-SCNC: 140 MMOL/L (ref 135–145)
T AXIS (DEGREES): 87
VENTRICULAR RATE EKG/MIN (BPM): 81
WBC # BLD: 7.3 K/MCL (ref 4.2–11)

## 2021-11-15 PROCEDURE — 10002807 HB RX 258: Performed by: EMERGENCY MEDICINE

## 2021-11-15 PROCEDURE — 85025 COMPLETE CBC W/AUTO DIFF WBC: CPT | Performed by: EMERGENCY MEDICINE

## 2021-11-15 PROCEDURE — 99285 EMERGENCY DEPT VISIT HI MDM: CPT

## 2021-11-15 PROCEDURE — 93005 ELECTROCARDIOGRAM TRACING: CPT | Performed by: NURSE PRACTITIONER

## 2021-11-15 PROCEDURE — 93010 ELECTROCARDIOGRAM REPORT: CPT | Performed by: INTERNAL MEDICINE

## 2021-11-15 PROCEDURE — 83735 ASSAY OF MAGNESIUM: CPT | Performed by: EMERGENCY MEDICINE

## 2021-11-15 PROCEDURE — 84703 CHORIONIC GONADOTROPIN ASSAY: CPT

## 2021-11-15 PROCEDURE — 96365 THER/PROPH/DIAG IV INF INIT: CPT

## 2021-11-15 PROCEDURE — 80053 COMPREHEN METABOLIC PANEL: CPT | Performed by: EMERGENCY MEDICINE

## 2021-11-15 RX ORDER — MAGNESIUM SULFATE 1 G/100ML
1 INJECTION INTRAVENOUS ONCE
Status: COMPLETED | OUTPATIENT
Start: 2021-11-15 | End: 2021-11-15

## 2021-11-15 RX ADMIN — MAGNESIUM SULFATE HEPTAHYDRATE 1 G: 1 INJECTION, SOLUTION INTRAVENOUS at 11:22

## 2021-11-15 ASSESSMENT — PAIN SCALES - GENERAL: PAINLEVEL_OUTOF10: 0

## 2022-08-09 ENCOUNTER — OFFICE VISIT (OUTPATIENT)
Dept: OBGYN CLINIC | Facility: CLINIC | Age: 37
End: 2022-08-09
Payer: COMMERCIAL

## 2022-08-09 VITALS
DIASTOLIC BLOOD PRESSURE: 70 MMHG | HEIGHT: 64.5 IN | WEIGHT: 139.81 LBS | SYSTOLIC BLOOD PRESSURE: 110 MMHG | BODY MASS INDEX: 23.58 KG/M2

## 2022-08-09 DIAGNOSIS — Z12.4 CERVICAL CANCER SCREENING: ICD-10-CM

## 2022-08-09 DIAGNOSIS — Z01.419 WELL WOMAN EXAM WITH ROUTINE GYNECOLOGICAL EXAM: Primary | ICD-10-CM

## 2022-08-09 PROCEDURE — 3008F BODY MASS INDEX DOCD: CPT | Performed by: OBSTETRICS & GYNECOLOGY

## 2022-08-09 PROCEDURE — 3074F SYST BP LT 130 MM HG: CPT | Performed by: OBSTETRICS & GYNECOLOGY

## 2022-08-09 PROCEDURE — 99395 PREV VISIT EST AGE 18-39: CPT | Performed by: OBSTETRICS & GYNECOLOGY

## 2022-08-09 PROCEDURE — 3078F DIAST BP <80 MM HG: CPT | Performed by: OBSTETRICS & GYNECOLOGY

## 2022-08-09 RX ORDER — FLUTICASONE PROPIONATE 50 MCG
SPRAY, SUSPENSION (ML) NASAL
COMMUNITY
Start: 2022-04-30

## 2022-08-25 LAB — HPV I/H RISK 1 DNA SPEC QL NAA+PROBE: POSITIVE

## 2022-08-30 LAB
HPV16 DNA CVX QL PROBE+SIG AMP: NEGATIVE
HPV18 DNA CVX QL PROBE+SIG AMP: NEGATIVE

## 2022-08-31 ENCOUNTER — TELEPHONE (OUTPATIENT)
Dept: OBGYN CLINIC | Facility: CLINIC | Age: 37
End: 2022-08-31

## 2022-08-31 NOTE — TELEPHONE ENCOUNTER
Patient needs colposcopy. Please contact her to schedule. BEST TIME TO REACH HER: [phone] or after 2:30 pm    Thank you! car

## 2022-11-08 PROBLEM — I49.3 PVC'S (PREMATURE VENTRICULAR CONTRACTIONS): Status: ACTIVE | Noted: 2019-05-29

## 2022-11-09 ENCOUNTER — OFFICE VISIT (OUTPATIENT)
Dept: OBGYN CLINIC | Facility: CLINIC | Age: 37
End: 2022-11-09
Payer: COMMERCIAL

## 2022-11-09 VITALS
SYSTOLIC BLOOD PRESSURE: 108 MMHG | DIASTOLIC BLOOD PRESSURE: 64 MMHG | WEIGHT: 138.19 LBS | HEART RATE: 62 BPM | HEIGHT: 64.5 IN | BODY MASS INDEX: 23.3 KG/M2

## 2022-11-09 DIAGNOSIS — Z01.812 PRE-PROCEDURAL LABORATORY EXAMINATION: ICD-10-CM

## 2022-11-09 DIAGNOSIS — R87.610 ATYPICAL SQUAMOUS CELL CHANGES OF UNDETERMINED SIGNIFICANCE (ASCUS) ON CERVICAL CYTOLOGY WITH POSITIVE HIGH RISK HUMAN PAPILLOMA VIRUS (HPV): Primary | ICD-10-CM

## 2022-11-09 DIAGNOSIS — R87.810 ATYPICAL SQUAMOUS CELL CHANGES OF UNDETERMINED SIGNIFICANCE (ASCUS) ON CERVICAL CYTOLOGY WITH POSITIVE HIGH RISK HUMAN PAPILLOMA VIRUS (HPV): Primary | ICD-10-CM

## 2022-11-09 LAB
CONTROL LINE PRESENT WITH A CLEAR BACKGROUND (YES/NO): YES YES/NO
KIT LOT #: NORMAL NUMERIC

## 2022-11-09 PROCEDURE — 88305 TISSUE EXAM BY PATHOLOGIST: CPT | Performed by: OBSTETRICS & GYNECOLOGY

## 2022-11-09 NOTE — PROCEDURES
Colposcopy Procedure Note    Diagnosis and Indication: ASCUS pos HPV, distant hx of dysplasia    Procedure Details:   Urine pregnancy test negative. The risks including infection and bleeding were explained to the patient and verbal informed consent obtained. Patient's last menstrual period was 10/31/2022. Colposcopy performed using dilute acetic acid solution. Raoul filter utilized. Colposcopy was adequate with visualization of the entire transformation zone. Mild acetowhite epithelial changes noted from 10-11:30          Biopsy- performed at the 11 o'clock position  ECC performed. Condition:  Stable    Complications:  None    Impression:  BRANDON 1    Plan:  Await biopsy results and will then make recommendations for follow up. Likely recommend Pap/HPV one year if HPV/BRANDON 1 confirmed. Call if pain, persistent heavy bleeding, fever or discharge. 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

## 2022-11-09 NOTE — PROGRESS NOTES
Subjective:  44year old    Patient presents with:  Procedure: Colpo      Patient presents due to abnormal pap smear showing ASCUS positive HPV. History of LEEP  for dysplasia (?BRANDON 2). Patient is sexually active with 1 partner for past several years not using condoms. No Tobacco use. Review of Systems:  Pertinent items are noted in the HPI. Objective:  /64   Pulse 62   Ht 64.5\"   Wt 138 lb 3.2 oz (62.7 kg)   LMP 10/31/2022     Physical Examination:  General appearance: Well dressed, well nourished in no apparent distress  Neurologic/Psychiatric: Alert and oriented to person, place and time, mood normal, affect appropriate  Pelvic:    External genitalia- Normal, Bartholin's, urethra, skeins glands normal   Vagina- No vaginal lesions, no discharge   Cervix- No lesions, long/closed, no cervical motion tenderness    Assessment/Plan:  Abnormal Pap smear- check colposcopy. Counseled regarding cervical dysplasia and HPV. Safe sex practices discussed. Patient has had the Gardasil vaccine.     Diagnoses and all orders for this visit:    Pre-procedural laboratory examination  -     URINE PREGNANCY TEST

## 2023-08-29 NOTE — PROGRESS NOTES
Call Maria D to schedule (Mon, Wed, Thurs, Fri) [email or web address]      Septoplasty/Endoscopic Sinus Surgery/Turbinate Reduction    Septoplasty is a procedure designed to correct a deformity of the bone and cartilage that separate the two sides of the nose. The goal is to improve nasal breathing, reducing the work (and noise) of breathing due to nasal airway resistance. This procedure may also be required to improve visualization within the nose for treatment of sinusitis, nasal polyps, inflammation, tumors, allergies or bleeding. When the nasal septum is deformed, either by trauma or overgrowth of the cartilage, there is no medication that can really correct this fixed anatomic obstruction; surgical correction is required. While complications of septal surgery are rare, they can include septal perforation, failure to completely restore the nasal airway, post-operative bleeding, and nasal crusting.    Endoscopic sinus surgery is a functional procedure designed to enlarge the natural openings of the sinuses as they drain into the nose. The use of a thin fiberoptic telescope enables the surgeon to visualize the sinuses through the nostrils, without having to make an incision on the face, allowing the sinus openings to be enlarged. While the infected and diseased tissue lining the sinus cavities sometimes needs to be removed, inflammatory changes often correct themselves once these sinus openings are enlarged. In conjunction with septal surgery, endoscopic sinus surgery has the potential to eliminate or significantly improve sinus headaches, relieve sinus pain and pressure, reduce the frequency and severity of recurrent sinus infections, and decrease post-nasal drainage in 80-90% of cases. Those patients with underlying medical problems such as diabetes, cystic fibrosis, immune disorders, nasal polyps, asthma and allergies may require continued therapy for their sinus disease.    Sinus surgery, medical   for NOB visit. Here on  for confirmation of pregnancy ultrasound. Ultrasound dating = LMP. Monthly cycles. Last pap smear in 2018. No abnormal gyn hx. No problems since LMP. Hx of ventricular tachycardia. Exercise induced.  H management, and failure to treat sinus disease all have the same potential complications: orbital complications (abscess formation, transient or permanent double vision, vision impairment or loss, excessive tearing, and lid swelling), intra-cranial complications (leak of cerebrospinal fluid from around the brain, infection such as brain abscess or meningitis), loss of smell and taste, persistent or recurrent nasal obstruction, and continued sinus infections. Additional surgical risks include bleeding (both behind the eye and into the nose, requiring return to the operating room), complications from anesthesia, and death. Minor complications (post-operative infections requiring antibiotic therapy) occur in less than 10% of cases, major complications are estimated at 1 in 200,000 cases.    Turbinate reduction or trimming of abnormally enlarged inferior turbinates is intended to improve nasal breathing, nasal stuffiness, and reduce snoring. The turbinates may be surgically reduced, cauterized, or frozen to make them smaller and less prone to swelling and obstruction. In addition, cauterization or cryosurgery (freezing) may be required to decrease the amount of secretions produced by the nose.  Complications associated with turbinate surgery include intra-nasal crusting, nasal dryness, scarring and bleeding. Post-operative bleeding is uncommon and can be controlled with cautery. Nasal sprays may be required to relieve dryness and aid in healing.            Indications for Septoplasty:  Nasal airway obstruction or difficult nasal breathing causing mouth breathing, snoring, sleep apnea, headaches, or recurrent sinus infections  Frequent nosebleeds  Atypical facial pain due to contact between the septum and turbinates  Visualization of and access to the sinuses for sinus surgery    Indications for Sinus Surgery  Recurrent or chronic sinus disease (manifest as nasal congestion, altered sense of smell, thick purulent  post-nasal drainage, headaches and facial pressure, retro-orbital pain, chronic cough , mid-facial and maxillary tooth pain and eyelid or facial swelling) failing medical management  Orbital or intra-cranial extension of sinus disease  Nasal tumor or polyps causing sinus obstruction and/or infection  Asthma complicated by recurrent sinus infections  Cystic fibrosis complicated by recurrent sinus infection  Fungal sinusitis or acute bacterial sinusitis in an immunocompromised host    Indications for turbinate reduction  Chronic nasal obstruction secondary to turbinate hypertrophy  Vasomotor rhinitis  Allergic rhinitis    Pre-operative Instructions  No aspirin or aspirin-like products (Advil, Motrin, Nuprin, Alleve) for 2 weeks prior to surgery.  Tylenol or acetaminophen products are O.K. to take. Some supplements like Gingko Biloba should be avoided for 2 weeks before surgery. Please check the labels on any of your supplements.  No recreational drug use 2 weeks prior to surgery and 2 weeks after surgery.  Nothing by mouth after midnight the night before surgery.  Take any prescription medications (blood pressure medication, heart medication, etc.) with a sip of water on the morning of surgery.  Use any prescription inhalers or breathing treatments as directed the morning of surgery unless otherwise directed.  If CT scans were obtained at an independent radiology facility, make sure to bring copies with you to the hospital or office.  Someone must be able to accompany you to and from the hospital and stay with you the first night following surgery.      Post-Operative Instructions    You will have packing and splints in your nose for 2-3 days following surgery; this will make breathing through your nose difficult. A humidifier or vaporizer should be used in the bedroom to prevent throat pain with mouth breathing.    Frequent hot showers, breathing in steam from a pot of boiling water, or simply sniffing a small amount  of salt water or SALINE SPRAY (Ocean Spray) through your nose several times a day will help break up congestion and clear any clot or mucus that builds up within the sinuses after surgery. You may also clean the front of the nose and nostrils with a Q-tip dipped in hydrogen peroxide or warm soapy water; do not pull at the splints or the thin black strings attached to the nasal packing.    Change the moustache dressing as often as needed. Some oozing of blood and mucus is expected for 1-2 days after surgery, and the dressing may need to be changed as many as 8 or 9 times in the course of a day. If there is no drainage, then the dressing can be left off and you may simply dab at your nose with Kleenex as needed. Do not blow your nose until cleared to do so by our office.  For heavier nasal bleeding, apply an ice pack and pinch the nose just above the nasal tip and hold for 10 minutes.    Sleep with an extra pillow or two, elevating your head slightly. You do not need to sleep sitting up in a chair.    Take your pain medication as prescribed. Antibiotics are also used in the initial post-op period to prevent infection while the sinuses are packed. The decongestant-mucolytic medication and the saltwater nasal spray may be started once the packs and splints have been removed.    Take all of your routine medications as prescribed, unless told otherwise by your physician; any medications, which thin the blood, should be avoided. These include aspirin and aspirin-like products (Advil, Motrin, Excedrin, Alleve, Celebrex, Naproxyn). Migraine medications should be used only after discussion with your physician and should not be taken at the same time as a narcotic pain medication.    There are no diet restrictions, but tobacco use is prohibited as Nicotine decreases blood flow to the healing nasal tissues and can actually compromise wound healing.    Please make sure that you have eaten something the morning of packing and  splint removal; low blood sugar may make you feel faint when the packs are removed.    No heavy lifting (nothing more than 10 lbs), no bending or stooping to lift, and no vigorous exercise until cleared by the office. Instead of bending over, sit down and bring your feet up to put on your shoes.        No airplane travel for 2 weeks following sinus surgery; the cabin pressure changes can cause pain and swelling within the sinuses.    You can expect to have a stuffy nose for about 3-5 days after surgery, with some intermittent congestion for up to 2 weeks depending on a personal history of allergies or other factors. Sense of smell will be diminished during this time, and sense of taste will also be affected. There may be some tenderness or numbness in your upper teeth. You may clear old clot and discolored mucus for up to 3-4 weeks after surgery, depending on how frequently and how effectively you irrigate your nose with the saltwater spray. Use of the saline spray up to 4 times a day is highly recommended.    Signs of a post-operative infection, which may occur within the 6 weeks after surgery, include fever, foul odor in the nose, discolored nasal secretions, facial pain and pressure, and a cough. If any of these signs develop, contact our office to discuss treatment.    During regular business hours, call [phone] for any questions or concerns. After hours, you may leave a non emergency message for retrieval on the next business day. For emergencies call [phone] and have the physician on call paged.  Employer Paperwork/FMLA: If your employer requires paperwork for time off, please have it emailed or faxed to office at least one week in advance of surgery. It can be emailed to [email or web address]    Financial Obligations: Our office checks your insurance for eligibility and pre notification requirements. If there are any concerns we encourage you to call your insurance company for further  information. You may  Billing and Insurance:  In addition to a bill from our office, you may receive bills from:  Anesthesia- (The doctor in charge of putting you to sleep)  Pathology- (The doctor that analyzes any tissue removed from your body)  Hospital or Facility (The place where your surgery took place)    You are responsible for meeting your deductible and your co-insurance (percentage of amount over your deductible, up to the maximum out of pocket.)    Please Note: Postoperatively, a debridement(s)/nasal endoscopy will be performed by your physician or the physician's assistant. The Center for Medicaid and Medicare Services ([facility]) and the insurance companies have developed standardized codes (CPT's) that do NOT differentiate where the services are performed. Therefore, this visit may appear as a surgery code or procedure code on your insurance statement and/ or our bill. This visit is billable to your insurance, (may be more than $1500.00) and you are responsible for any coinsurances that apply.         GENERAL ANESTHESIA/IV SEDATION/SPINAL POST SURGERY INSTRUCTIONS    1. Rest at home (not necessarily in bed) for 24 hours following anesthesia. You may feel sleepy for the next 24 hours. Do not drive an automobile, operate heavy machinery, or make important decisions.    2. Your diet should start with clear liquids increasing to a light diet on the day of surgery. You may then resume your normal diet. Do not drink alcoholic beverages for 24 hours. If nausea occurs, limit diet to clear liquids (soda, tea, broth, Jell-O). If nausea continues for more than 12 hours, call your doctor.      4. Call your doctor if there is excessive:   A. Bleeding or drainage   B. Fever-10 1°F or higher   C. Swelling or redness    5. We strongly recommend a responsible adult to be with you for 24 hours following surgery. This recommendation is for your protection and safety.    6. Avoid smoking for 24 hours following general  anesthesia.    7. Drink plenty of fluids. If you are unable to urinate by 12:00 or a feeling of pressure occurs, call your surgeon and or go to the nearest emergency center.    8. If you any questions please call your Surgeon. If you cannot reach your doctor, call [facility] Emergency Department at [phone].    9. Other instructions:             OOPs... We make every effort to respect your privacy and dignity and may have missed taking this off prior to you going home.    If you find any of these, remove and throw away. Thank you for allowing us to be a part of your care.     Speedy recovery!    Your [facility] Care Team           Want to Say “Thank You” to a Nurse?  The TYSON Award® was created in memory of TOM Benitez by his family to say thank you to bedside nurses who provide an outstanding level of care.  Submit a nomination using any method below.     OR    [email or web address]

## 2023-11-06 ENCOUNTER — OFFICE VISIT (OUTPATIENT)
Dept: OBGYN CLINIC | Facility: CLINIC | Age: 38
End: 2023-11-06
Payer: COMMERCIAL

## 2023-11-06 VITALS
WEIGHT: 135.5 LBS | BODY MASS INDEX: 23 KG/M2 | SYSTOLIC BLOOD PRESSURE: 106 MMHG | DIASTOLIC BLOOD PRESSURE: 64 MMHG | HEART RATE: 53 BPM

## 2023-11-06 DIAGNOSIS — Z12.4 SCREENING FOR CERVICAL CANCER: ICD-10-CM

## 2023-11-06 DIAGNOSIS — Z01.419 ENCOUNTER FOR GYNECOLOGICAL EXAMINATION WITHOUT ABNORMAL FINDING: Primary | ICD-10-CM

## 2023-11-06 PROCEDURE — 87624 HPV HI-RISK TYP POOLED RSLT: CPT | Performed by: OBSTETRICS & GYNECOLOGY

## 2023-11-06 PROCEDURE — 88175 CYTOPATH C/V AUTO FLUID REDO: CPT | Performed by: OBSTETRICS & GYNECOLOGY

## 2023-11-06 PROCEDURE — 99395 PREV VISIT EST AGE 18-39: CPT | Performed by: OBSTETRICS & GYNECOLOGY

## 2023-11-06 PROCEDURE — 3078F DIAST BP <80 MM HG: CPT | Performed by: OBSTETRICS & GYNECOLOGY

## 2023-11-06 PROCEDURE — 3074F SYST BP LT 130 MM HG: CPT | Performed by: OBSTETRICS & GYNECOLOGY

## 2023-11-06 NOTE — PROGRESS NOTES
Subjective:  Patient presents with: Follow Up Pap    43year old female who presents for annual well woman visit without complaints, also follow up Pap due to hx of ASCUS neg HPV 8/2022. Distant hx of LEEP in 2009    Patient's last menstrual period was 10/31/2022. Menarche: 11 (11/9/2022  2:37 PM)  Period Cycle (Days): 28 days (11/9/2022  2:37 PM)  Period Duration (Days): 7 days (11/9/2022  2:37 PM)  Period Flow: heavy tapering down (11/9/2022  2:37 PM)  Use of Birth Control (if yes, specify type): Condoms (11/6/2023 11:42 AM)  Date When Birth Control Last Used: 2009 ocp (11/9/2022  2:37 PM)  Hx Prior Abnormal Pap: Yes ([facility] GYN INTERPRETATION: Atypical squamous cells of undetermined significance (ASC[facility]US) [facility]: Atypical squamous cells of undetermined significance (ASC-US) Important) (11/9/2022  2:37 PM)  Pap Date: 08/09/22 (11/9/2022  2:37 PM)  Pap Result Notes: [facility] GYN INTERPRETATION: Atypical squamous cells of undetermined significance (ASC-US) Important (11/9/2022  2:37 PM)      Pelvic Infections/STD: None  Contraception: condoms. Considering vasectomy    Review of Systems:  Pertinent items are noted in the HPI. Patient History:  New Medical Illness: None  New Surgeries: None  New Family History: None   reports that she has never smoked. She has never used smokeless tobacco.   reports current alcohol use of about 5.0 standard drinks of alcohol per week.     Most Recent Immunizations  Administered            Date(s) Administered    Covid-19 Vaccine Pfizer 30 mcg/0.3 ml                          03/14/2021      HPV (Gardasil)        08/13/2007      TDAP                  07/19/2019    Deferred                Date(s) Deferred    FLULAVAL 6 months & older 0.5 ml Prefilled syringe (68996)                          02/24/2019      Objective:  /64   Pulse 53   Wt 135 lb 8 oz (61.5 kg)   LMP 10/31/2022   Physical Examination:  General appearance: Well dressed, well nourished in no apparent distress  Neurologic/Psychiatric: Alert and oriented to person, place and time, mood normal, affect appropriate  Head: Normocephalic without obvious deformity, atraumatic  Neck: No thyromegaly, supple, non-tender, no masses, no adenopathy  Lungs: Clear to auscultation bilaterally, no rales, wheezes or rhonchi  Breasts: Symmetric, non-tender, no masses, lesions, retraction, dimpling or discharge bilaterally, no axillary or supraclavicular lymphadenopathy  Heart[de-identified] Regular rate and rhythm, no gallops or murmurs  Abdomen: Soft, non-tender, non-distended, no masses, no hepatosplenomegaly, no hernias, no inguinal lymphadenopathy  Pelvic:    External genitalia- Normal, Bartholin's, urethra, skeins glands normal   Vagina- No vaginal lesions, no discharge   Cervix- No lesions, long/closed, no cervical motion tenderness   Uterus- Normal sized, anteverted, non-tender, no masses   Adnexa-  Non-tender, no masses  Extremities: Non-tender, full range of motion, no clubbing, cyanosis or edema  Skin:  General inspection- no rashes, lesions or discoloration  Rectum: No hemorrhoids, no masses. Assessment/Plan:  Normal well-woman exam.  Hx of ASCUS neg HPV- Pap/HPV smear obtained. Patient offered chaperone for exam, declined    Diagnoses and all orders for this visit:    Encounter for gynecological examination without abnormal finding    Screening for cervical cancer  -     ThinPrep PAP Smear; Future  -     Hpv Dna  High Risk , Thin Prep Collect;  Future      Return in about 1 year (around 11/6/2024) for Annual Well Woman Exam.

## 2023-11-07 LAB — HPV I/H RISK 1 DNA SPEC QL NAA+PROBE: NEGATIVE

## 2023-11-10 LAB
.: NORMAL
.: NORMAL

## (undated) NOTE — LETTER
[facility]  Mehuljory Mckeonmisael [id number] [facility], [address] [place] [address]    Consent for Operation    Date: __________________    Time: _______________    1.  I authorize the performance upon Chinmay Taylor the following operation:                              Ange videotape. [facility] will not be responsible for storage or maintenance of this tape. 6. For the purpose of advancing medical education, I consent to the admittance of observers to the Operating Room.     7. I authorize the use of any specimen, organs Signature of Patient:   ___________________________    When the patient is a minor or mentally incompetent to give consent:  Signature of person authorized to consent for patient: ___________________________   Relationship to patient: _____________________ 3. I understand how the anesthesia medicine will help me (benefits). 4. I understand that with any type of anesthesia medicine there are risks:  a.  The most common risks are: nausea, vomiting, sore throat, muscle soreness, damage to my eyes, mouth, or t _____________________________________________________________________________  Witness        Date   Time  I have verified that the signature is that of the patient or patient’s representative, and that it was signed before the procedure    Page 2 of 2

## (undated) NOTE — Clinical Note
Dr. Iraida Medina, pt is currently 28 wk pregnant. She has not had issues this pregnancy with her svt. She and her family are very concerned re: labor process. I reiterated that [address] [place] [address] would be available for consult and interventions if needed.  She feels t